# Patient Record
Sex: MALE | Race: WHITE | NOT HISPANIC OR LATINO | Employment: FULL TIME | URBAN - METROPOLITAN AREA
[De-identification: names, ages, dates, MRNs, and addresses within clinical notes are randomized per-mention and may not be internally consistent; named-entity substitution may affect disease eponyms.]

---

## 2018-03-14 ENCOUNTER — HOSPITAL ENCOUNTER (EMERGENCY)
Facility: CLINIC | Age: 38
Discharge: HOME | End: 2018-03-14
Attending: NURSE PRACTITIONER | Admitting: NURSE PRACTITIONER
Payer: MEDICAID

## 2018-03-14 VITALS
SYSTOLIC BLOOD PRESSURE: 129 MMHG | RESPIRATION RATE: 16 BRPM | WEIGHT: 200 LBS | BODY MASS INDEX: 26.51 KG/M2 | OXYGEN SATURATION: 100 % | DIASTOLIC BLOOD PRESSURE: 78 MMHG | HEART RATE: 54 BPM | HEIGHT: 73 IN

## 2018-03-14 DIAGNOSIS — G56.21 ULNAR NEUROPATHY OF RIGHT UPPER EXTREMITY: ICD-10-CM

## 2018-03-14 DIAGNOSIS — M77.01 MEDIAL EPICONDYLITIS OF ELBOW, RIGHT: ICD-10-CM

## 2018-03-14 PROCEDURE — G0463 HOSPITAL OUTPT CLINIC VISIT: HCPCS

## 2018-03-14 PROCEDURE — 99213 OFFICE O/P EST LOW 20 MIN: CPT | Performed by: NURSE PRACTITIONER

## 2018-03-14 ASSESSMENT — ENCOUNTER SYMPTOMS
HEADACHES: 0
ARTHRALGIAS: 1
BACK PAIN: 0
JOINT SWELLING: 0
SPEECH DIFFICULTY: 0
EYE PAIN: 0
SHORTNESS OF BREATH: 0
FEVER: 0
DIZZINESS: 0
COUGH: 0
NUMBNESS: 1
ABDOMINAL PAIN: 0
MYALGIAS: 0
APPETITE CHANGE: 0
CHILLS: 0
NECK PAIN: 0

## 2018-03-14 NOTE — DISCHARGE INSTRUCTIONS
Counter force brace may be helpful.  Physical therapy referral sent, if needed.           brace     Understanding Medial Epicondylitis    Several muscles attach to the arm at the elbow joint. The tough bands of tissue that attach muscle to bones are called tendons. The bone in the upper arm has knobs on the farthest end called epicondyles. Tendons attach some arm muscles to these knobs. The tissues in this area can become irritated.  Epicondylitis is the medical term for a painful elbow over the epicondyle. Medial refers to the inner side of the elbow. Medial epicondylitis is sometimes called  golfer s elbow.      How to say it  AMANDA-kyle-rosita [id number]-AYBI-dye-lie-tis   Causes of medial epicondylitis  A painful inner elbow may be caused by:    Using an elbow or hand the same way over and over    Using poor form or too much force in a sport such as golf, tennis, or baseball    Lifting too heavy a weight    Other injuries to the arm or elbow  Symptoms of medial epicondylitis    Pain or tenderness on the inside of the elbow that may travel down the forearm    Pain when moving the wrist    Pain or weakness when gripping something    A crackling sound or grating feeling when moving the elbow  Treatment for medial epicondylitis  Treatments may include:    Avoiding or changing the action that caused the problem. This helps prevent irritating the tissues more.    Prescription or over-the-counter pain medicines. These help reduce inflammation, swelling, and pain.    Cold or heat packs. These help reduce pain and swelling.    Stretching and other exercises. These improve flexibility and strength.    Physical therapy. This may include exercises or other treatments.    Injections of medicine. This may relieve symptoms.  If other treatments do not relieve symptoms, you may need surgery.  Possible complications  If you don t give your elbow time to heal, symptoms may return or get worse. Follow your healthcare provider s  instructions on resting and treating your elbow.     When to call your healthcare provider  Call your healthcare provider right away if you have any of these:    Fever of 100.4 F (38 C) or higher, or as directed    Redness, swelling, or warmth that gets worse    Symptoms that don t get better with prescribed medicines, or get worse    New symptoms   Date Last Reviewed: 3/10/2016    0522-7222 [employer]. [address] [place] [address], [place] [address]. All rights reserved. This information is not intended as a substitute for professional medical care. Always follow your healthcare professional's instructions.

## 2018-03-14 NOTE — ED AVS SNAPSHOT
[facility] Emergency Department    [address] [facility] [address] [address]    Phone:  [phone]    Fax:  [phone]                                       Jaspreet Brooks   MRN: [id number]    Department:  [facility] Emergency Department   Date of Visit:  3/14/2018           Patient Information     Date Of Birth          1980        Your diagnoses for this visit were:     Medial epicondylitis of elbow, right     Ulnar neuropathy of right upper extremity        You were seen by Carina Velazquez APRN CNP.        Discharge Instructions         Counter force brace may be helpful.  Physical therapy referral sent, if needed.           brace     Understanding Medial Epicondylitis    Several muscles attach to the arm at the elbow joint. The tough bands of tissue that attach muscle to bones are called tendons. The bone in the upper arm has knobs on the farthest end called epicondyles. Tendons attach some arm muscles to these knobs. The tissues in this area can become irritated.  Epicondylitis is the medical term for a painful elbow over the epicondyle. Medial refers to the inner side of the elbow. Medial epicondylitis is sometimes called  golfer s elbow.      How to say it  AMANDA-kyle-rosita [id number]-nei-[employer]-dye-lie-tis   Causes of medial epicondylitis  A painful inner elbow may be caused by:    Using an elbow or hand the same way over and over    Using poor form or too much force in a sport such as golf, tennis, or baseball    Lifting too heavy a weight    Other injuries to the arm or elbow  Symptoms of medial epicondylitis    Pain or tenderness on the inside of the elbow that may travel down the forearm    Pain when moving the wrist    Pain or weakness when gripping something    A crackling sound or grating feeling when moving the elbow  Treatment for medial epicondylitis  Treatments may include:    Avoiding or changing the action that caused the problem. This helps prevent irritating the tissues  more.    Prescription or over-the-counter pain medicines. These help reduce inflammation, swelling, and pain.    Cold or heat packs. These help reduce pain and swelling.    Stretching and other exercises. These improve flexibility and strength.    Physical therapy. This may include exercises or other treatments.    Injections of medicine. This may relieve symptoms.  If other treatments do not relieve symptoms, you may need surgery.  Possible complications  If you don t give your elbow time to heal, symptoms may return or get worse. Follow your healthcare provider s instructions on resting and treating your elbow.     When to call your healthcare provider  Call your healthcare provider right away if you have any of these:    Fever of 100.4 F (38 C) or higher, or as directed    Redness, swelling, or warmth that gets worse    Symptoms that don t get better with prescribed medicines, or get worse    New symptoms   Date Last Reviewed: 3/10/2016    7739-4920 [employer]. [address] [place] [address]. All rights reserved. This information is not intended as a substitute for professional medical care. Always follow your healthcare professional's instructions.          24 Hour Appointment Hotline       To make an appointment at any [facility], call [phone] ([phone]). If you don't have a family doctor or clinic, we will help you find one. [facility] clinics are conveniently located to serve the needs of you and your family.          ED Discharge Orders     PHYSICAL THERAPY REFERRAL       *This therapy referral will be filtered to a centralized scheduling office at [facility] and the patient will receive a call to schedule an appointment at a [facility] location most convenient for them. *     [facility] provides Physical Therapy evaluation and treatment and many specialty services across the [facility] system.  If requesting a specialty  "program, please choose from the list below.    If you have not heard from the scheduling office within 2 business days, please call [phone] for all locations, with the exception of [place], please call [phone] and [facility], please call [phone]  Treatment: Evaluation & Treatment  Special Instructions/Modalities: none  Special Programs: None    Please be aware that coverage of these services is subject to the terms and limitations of your health insurance plan.  Call member services at your health plan with any benefit or coverage questions.      **Note to Provider:  If you are referring outside of [facility] for the therapy appointment, please list the name of the location in the \"special instructions\" above, print the referral and give to the patient to schedule the appointment.                     Review of your medicines      Notice     You have not been prescribed any medications.            Orders Needing Specimen Collection     None      Pending Results     No orders found from 3/12/2018 to 3/15/2018.            Pending Culture Results     No orders found from 3/12/2018 to 3/15/2018.            Pending Results Instructions     If you had any lab results that were not finalized at the time of your Discharge, you can call the ED Lab Result RN at [phone]. You will be contacted by this team for any positive Lab results or changes in treatment. The nurses are available 7 days a week from 10A to 6:30P.  You can leave a message 24 hours per day and they will return your call.        Test Results From Your Hospital Stay               Thank you for choosing [facility]       Thank you for choosing [facility] for your care. Our goal is always to provide you with excellent care. Hearing back from our patients is one way we can continue to improve our services. Please take a few minutes to complete the written survey that you may receive in the mail after you visit with us. Thank you!        MyChart " "Information     [employer] lets you send messages to your doctor, view your test results, renew your prescriptions, schedule appointments and more. To sign up, go to [email or web address] . Click on \"Log in\" on the left side of the screen, which will take you to the Welcome page. Then click on \"Sign up Now\" on the right side of the page.     You will be asked to enter the access code listed below, as well as some personal information. Please follow the directions to create your username and password.     Your access code is: [email or web address]  Expires: 2018  1:10 PM     Your access code will  in 90 days. If you need help or a new code, please call your [facility] clinic or [phone].        Care EveryWhere ID     This is your Care EveryWhere ID. This could be used by other organizations to access your [facility] medical records  [id number]        Equal Access to Services     RYAN BERGMAN AH: Hadii larry bartho Sojaylen, waaxda luqadaha, qaybta kaalmada adeyuval, emely thomas . So [facility] [phone].    ATENCIÓN: Si habla español, tiene a perry disposición servicios gratuitos de asistencia lingüística. Llame al [phone].    We comply with applicable federal civil rights laws and [place] laws. We do not discriminate on the basis of race, color, national origin, age, disability, sex, sexual orientation, or gender identity.            After Visit Summary       This is your record. Keep this with you and show to your community pharmacist(s) and doctor(s) at your next visit.                  "

## 2018-03-14 NOTE — ED AVS SNAPSHOT
[facility] Emergency Department    [address] [facility] [address]    Phone:  [phone]    Fax:  [phone]                                       Jaspreet Brooks   MRN: [id number]    Department:  [facility] Emergency Department   Date of Visit:  3/14/2018           After Visit Summary Signature Page     I have received my discharge instructions, and my questions have been answered. I have discussed any challenges I see with this plan with the nurse or doctor.    ..........................................................................................................................................  Patient/Patient Representative Signature      ..........................................................................................................................................  Patient Representative Print Name and Relationship to Patient    ..................................................               ................................................  Date                                            Time    ..........................................................................................................................................  Reviewed by Signature/Title    ...................................................              ..............................................  Date                                                            Time

## 2018-03-14 NOTE — ED PROVIDER NOTES
History     Chief Complaint   Patient presents with     Numbness     Pt c/o 2 weeks right pinkey finger numbness - denies any chest pain, no shortness of breath - hx of multiple strokes in the past - pt also c/o right elbow discomfort      HPI  Jaspreet Brooks is a 37 year old male who presents to urgent care for evaluation of numbness in his right fifth finger that has been consistent for the last 2-3 weeks. Patient recalls right medial elbow pain a couple weeks ago but this resolved. 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 Denies any numbness elsewhere. Denies weakness. Denies hand swelling. Denies headache or dizziness.     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 Patient stopped medication 4 months ago, after taking for nearly a year and a half.  States he was told by one doctor that he only needed to take it for 1 year and another doctor said he needed to take for life.     Problem List:    There are no active problems to display for this patient.       Past Medical History:    No past medical history on file.    Past Surgical History:    No past surgical history on file.    Family History:    No family history on file.    Social History:  Marital Status:  Single [1]  Social History   Substance Use Topics     Smoking status: Never Smoker     Smokeless tobacco: Not on file     Alcohol use No        Medications:      No current outpatient prescriptions on file.      Review of Systems   Constitutional: Negative for appetite change, chills and fever.   HENT: Negative for congestion.    Eyes: Negative for pain and visual disturbance.   Respiratory: Negative for cough and shortness of breath.    Cardiovascular: Negative for chest pain.   Gastrointestinal: Negative for abdominal pain.   Musculoskeletal: Positive for arthralgias (right elbow, resolved).  "Negative for back pain, joint swelling, myalgias and neck pain.   Neurological: Positive for numbness (right fifth finger.). Negative for dizziness, syncope, speech difficulty and headaches.       Physical Exam   BP: 129/78  Pulse: 54  Resp: 16  Height: 185.4 cm (6' 1\")  Weight: 90.7 kg (200 lb)  SpO2: 100 %      Physical Exam   Constitutional: He is oriented to person, place, and time. He appears well-developed and well-nourished. No distress.   HENT:   Head: Normocephalic and atraumatic.   Right Ear: External ear normal.   Left Ear: External ear normal.   Nose: Nose normal.   Mouth/Throat: Oropharynx is clear and moist.   Eyes: Conjunctivae and EOM are normal.   Neck: Normal range of motion. Neck supple.   Cardiovascular: Normal rate, regular rhythm and normal heart sounds.    Pulmonary/Chest: Effort normal and breath sounds normal.   Musculoskeletal: Normal range of motion. He exhibits no edema or tenderness.   Neurological: He is alert and oriented to person, place, and time.   Skin: Skin is warm and dry.       ED Course     ED Course     Procedures        No results found for this or any previous visit (from the past 24 hour(s)).    Medications - No data to display    Assessments & Plan (with Medical Decision Making)   Based on history of right medial elbow pain a couple weeks ago and the persistent numbness that is focal to his right fifth finger, I suspect patient is suffering from ulnar neuropathy due to medial epicondylitis of the right elbow. I recommend physical therapy, continue to observe, and try counter force brace.  Patient is agreeable to this plan. I also recommend he establish a PCP given history of CVA and previously on anti-coagulation.    I have reviewed the nursing notes.    I have reviewed the findings, diagnosis, plan and need for follow up with the patient.      New Prescriptions    No medications on file       Final diagnoses:   Medial epicondylitis of elbow, right   Ulnar neuropathy of " right upper extremity       3/14/2018   [facility] EMERGENCY DEPARTMENT     Carina Velazquez, ANGIE CNP  03/14/18 7414

## 2023-02-01 ENCOUNTER — APPOINTMENT (OUTPATIENT)
Dept: ULTRASOUND IMAGING | Facility: CLINIC | Age: 43
End: 2023-02-01
Attending: FAMILY MEDICINE

## 2023-02-01 ENCOUNTER — HOSPITAL ENCOUNTER (EMERGENCY)
Facility: CLINIC | Age: 43
Discharge: HOME | End: 2023-02-01
Attending: FAMILY MEDICINE | Admitting: FAMILY MEDICINE

## 2023-02-01 VITALS
RESPIRATION RATE: 18 BRPM | BODY MASS INDEX: 29.16 KG/M2 | OXYGEN SATURATION: 99 % | TEMPERATURE: 97.9 F | HEIGHT: 73 IN | WEIGHT: 220 LBS | DIASTOLIC BLOOD PRESSURE: 75 MMHG | HEART RATE: 72 BPM | SYSTOLIC BLOOD PRESSURE: 140 MMHG

## 2023-02-01 DIAGNOSIS — M77.11 LATERAL EPICONDYLITIS OF RIGHT ELBOW: ICD-10-CM

## 2023-02-01 PROCEDURE — 93971 EXTREMITY STUDY: CPT | Mod: RT

## 2023-02-01 PROCEDURE — 99284 EMERGENCY DEPT VISIT MOD MDM: CPT | Performed by: FAMILY MEDICINE

## 2023-02-01 PROCEDURE — 99284 EMERGENCY DEPT VISIT MOD MDM: CPT | Mod: 25 | Performed by: FAMILY MEDICINE

## 2023-02-01 RX ORDER — APIXABAN 5 MG (74)
KIT ORAL
Qty: 74 EACH | Refills: 0 | Status: SHIPPED | OUTPATIENT
Start: 2023-02-01 | End: 2023-03-10

## 2023-02-01 ASSESSMENT — ACTIVITIES OF DAILY LIVING (ADL): ADLS_ACUITY_SCORE: 35

## 2023-02-01 NOTE — ED NOTES
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

## 2023-02-01 NOTE — DISCHARGE INSTRUCTIONS
RETURN TO THE EMERGENCY ROOM FOR THE FOLLOWING:    Severely worsened pain with fever greater than 101, or at anytime for any concern.    FOLLOW UP:    With your primary clinic if not improved over the next 2 to 3 weeks.  Consider reaching out to schedule an appointment now in order to have a date to follow-up on.    Also, schedule an appointment with your primary clinic to discuss your clotting history and need for anticoagulation.  The prescription I am providing is only short-term.    TREATMENT RECOMMENDATIONS:    Eliquis.    NURSE ADVICE LINE:  [phone] or [phone]

## 2023-02-01 NOTE — ED TRIAGE NOTES
Pt c/o right elbow pain, no injury, has hx clotting disorder, is concerned about this.      Triage Assessment     Row Name 02/01/23 9632       Triage Assessment (Adult)    Airway WDL WDL       Respiratory WDL    Respiratory WDL WDL       Skin Circulation/Temperature WDL    Skin Circulation/Temperature WDL WDL       Cardiac WDL    Cardiac WDL WDL       Peripheral/Neurovascular WDL    Peripheral Neurovascular WDL WDL       Cognitive/Neuro/Behavioral WDL    Cognitive/Neuro/Behavioral WDL WDL

## 2023-02-01 NOTE — ED PROVIDER NOTES
"  HPI   Patient is a 42-year-old male presenting with concern for DVT in his right upper extremity.  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  He had been on Coumadin up until about 6 months ago.  He stopped it, \"because I have just been lazy.\"  He has not been using aspirin as replacement.      The patient recognized right elbow and forearm pain starting last week.  He feels like the elbow is swollen.  It is worsened with elbow movement and with extension of the hand at the wrist.  He denies any new repetitive motion involving his right arm or hand.  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  No chest pain or shortness of breath.  No headache.  No leg pain or swelling.  No rash or fever.        Allergies:  No Known Allergies  Problem List:    There are no problems to display for this patient.     Past Medical History:    No past medical history on file.  Past Surgical History:    No past surgical history on file.  Family History:    No family history on file.  Social History:  Marital Status:  Single [1]  Social History     Tobacco Use     Smoking status: Never   Substance Use Topics     Alcohol use: No     Drug use: No      Medications:    Apixaban Starter Pack (ELIQUIS DVT/PE STARTER PACK) 5 MG TBPK      Review of Systems   All other systems reviewed and are negative.      PE   BP: (!) 140/73  Pulse: 70  Temp: 97.9  F (36.6  C)  Height: 185.4 cm (6' 1\")  Weight: 99.8 kg (220 lb)  SpO2: 100 %  Physical Exam  Vitals and nursing note reviewed.   Constitutional:       General: He is not in acute distress.  HENT:      Head: Atraumatic.      Right Ear: External ear normal.      Left Ear: External ear normal.      Nose: Nose normal.      Mouth/Throat:      Mouth: Mucous membranes are moist.      Pharynx: Oropharynx is clear.   Eyes:      General: No scleral icterus.     Extraocular Movements: Extraocular movements intact.      Conjunctiva/sclera: Conjunctivae normal.      " Pupils: Pupils are equal, round, and reactive to light.   Cardiovascular:      Rate and Rhythm: Normal rate.   Pulmonary:      Effort: Pulmonary effort is normal. No respiratory distress.   Musculoskeletal:         General: Normal range of motion.      Cervical back: Normal range of motion.      Comments: Tenderness over the right lateral epicondyle.  He has pain at the lateral epicondyle with extension at the wrist.  No appreciable arm swelling.   Skin:     General: Skin is warm and dry.   Neurological:      Mental Status: He is alert and oriented to person, place, and time.   Psychiatric:         Behavior: Behavior normal.         ED COURSE and MDM   1344.  Patient has right elbow pain and subjective swelling.  Suspect this is lateral epicondylitis.  However, the patient is off of anticoagulation and has a long, extensive history of thrombosis.  Ultrasound of the right upper quadrant pending.  If unremarkable, the patient will be discharged home and follow-up.  Electronic medical chart reviewed, including medical problems, medications, medical allergies, social history.  Recent hospitalizations and surgical procedures reviewed.  Recent clinic visits and consultations reviewed.  Recent labs and test results reviewed.  Nursing notes reviewed.    1531.  Ultrasound unremarkable.  Lateral epicondylitis likely.  I did discuss using anticoagulation and a prescription for Eliquis is given.  He does not currently have DVT or PE.  This is prophylaxis dosing, therefore I provided Eliquis 5 mg twice a day instructions.  The starter pack prescription was ordered.  Patient agrees with the plan would like to be discharged home.    1532.  The patient, their parent if applicable, and/or their medical decision maker(s) and I have reviewed all of the available historical information, applicable PMH, physical exam findings, and objective diagnostic data gathered during this ED visit.  We then discussed all work-up options and then  together agreed upon the course taken during this visit.  The ultimate disposition and plan was a cooperative decision made between myself and the patient, their parent if applicable, and/or their legal decision maker(s).  The risks and benefits of all decisions made during this visit were discussed to the best of my abilities given the circumstances, and all parties are understanding of the pertinent ramifications of these decisions.      LABS  Labs Ordered and Resulted from Time of ED Arrival to Time of ED Departure - No data to display    IMAGING  Images reviewed by me.  Radiology report also reviewed.  US Upper Extremity Venous Duplex Right   Final Result   IMPRESSION: Negative for DVT in the right upper extremity.      JAIRO JACOBSON MD            SYSTEM ID:  [id number]          Procedures    Medications - No data to display      IMPRESSION       ICD-10-CM    1. Lateral epicondylitis of right elbow  M77.11                Medication List      Started    Eliquis DVT/PE Starter Pack 5 MG Tbpk  Generic drug: Apixaban Starter Pack  Take 5 mg by mouth 2 times daily for 7 days, THEN 5 mg 2 times daily for 30 days.  Start taking on: February 1, 2023                        Yossi Burrell MD  02/01/23 1537

## 2023-06-28 ENCOUNTER — APPOINTMENT (OUTPATIENT)
Dept: GENERAL RADIOLOGY | Facility: CLINIC | Age: 43
End: 2023-06-28
Attending: EMERGENCY MEDICINE

## 2023-06-28 ENCOUNTER — HOSPITAL ENCOUNTER (EMERGENCY)
Facility: CLINIC | Age: 43
Discharge: HOME | End: 2023-06-28
Attending: EMERGENCY MEDICINE | Admitting: EMERGENCY MEDICINE

## 2023-06-28 VITALS
TEMPERATURE: 96 F | SYSTOLIC BLOOD PRESSURE: 145 MMHG | BODY MASS INDEX: 29.16 KG/M2 | OXYGEN SATURATION: 99 % | HEIGHT: 73 IN | DIASTOLIC BLOOD PRESSURE: 91 MMHG | WEIGHT: 220 LBS | RESPIRATION RATE: 16 BRPM | HEART RATE: 63 BPM

## 2023-06-28 DIAGNOSIS — S41.112A LACERATION OF LEFT UPPER EXTREMITY, INITIAL ENCOUNTER: ICD-10-CM

## 2023-06-28 PROCEDURE — 90471 IMMUNIZATION ADMIN: CPT | Performed by: EMERGENCY MEDICINE

## 2023-06-28 PROCEDURE — 250N000011 HC RX IP 250 OP 636: Performed by: EMERGENCY MEDICINE

## 2023-06-28 PROCEDURE — 99283 EMERGENCY DEPT VISIT LOW MDM: CPT | Performed by: EMERGENCY MEDICINE

## 2023-06-28 PROCEDURE — 90715 TDAP VACCINE 7 YRS/> IM: CPT | Performed by: EMERGENCY MEDICINE

## 2023-06-28 PROCEDURE — 73090 X-RAY EXAM OF FOREARM: CPT | Mod: LT

## 2023-06-28 PROCEDURE — 99284 EMERGENCY DEPT VISIT MOD MDM: CPT | Performed by: EMERGENCY MEDICINE

## 2023-06-28 RX ORDER — SULFAMETHOXAZOLE/TRIMETHOPRIM 800-160 MG
1 TABLET ORAL 2 TIMES DAILY
Qty: 20 TABLET | Refills: 0 | Status: SHIPPED | OUTPATIENT
Start: 2023-06-28 | End: 2023-07-08

## 2023-06-28 RX ORDER — HYDROCODONE BITARTRATE AND ACETAMINOPHEN 5; 325 MG/1; MG/1
1 TABLET ORAL EVERY 6 HOURS PRN
Qty: 6 TABLET | Refills: 0 | Status: SHIPPED | OUTPATIENT
Start: 2023-06-28 | End: 2023-08-08

## 2023-06-28 RX ADMIN — CLOSTRIDIUM TETANI TOXOID ANTIGEN (FORMALDEHYDE INACTIVATED), CORYNEBACTERIUM DIPHTHERIAE TOXOID ANTIGEN (FORMALDEHYDE INACTIVATED), BORDETELLA PERTUSSIS TOXOID ANTIGEN (GLUTARALDEHYDE INACTIVATED), BORDETELLA PERTUSSIS FILAMENTOUS HEMAGGLUTININ ANTIGEN (FORMALDEHYDE INACTIVATED), BORDETELLA PERTUSSIS PERTACTIN ANTIGEN, AND BORDETELLA PERTUSSIS FIMBRIAE 2/3 ANTIGEN 0.5 ML: 5; 2; 2.5; 5; 3; 5 INJECTION, SUSPENSION INTRAMUSCULAR at 10:00

## 2023-06-28 NOTE — ED NOTES
Wound care dressing applied per MD order. Discharge instructions reviewed in detail.  Pt verbalized understanding.  No further questions or concerns.

## 2023-06-28 NOTE — ED TRIAGE NOTES
Pt using drill at work, slipped and drill bit went into left forearm about an inch per pt report. Last tetanus 4-5 year ago pt thinks.      Triage Assessment     Row Name 06/28/23 0916       Triage Assessment (Adult)    Airway WDL WDL       Respiratory WDL    Respiratory WDL WDL       Skin Circulation/Temperature WDL    Skin Circulation/Temperature WDL WDL       Cardiac WDL    Cardiac WDL WDL       Peripheral/Neurovascular WDL    Peripheral Neurovascular WDL WDL       Cognitive/Neuro/Behavioral WDL    Cognitive/Neuro/Behavioral WDL WDL

## 2023-06-28 NOTE — ED PROVIDER NOTES
"  History     Chief Complaint   Patient presents with     Wound Check     Work injury     HPI  Jaspreet Brooks is a 43 year old male who is right-hand-dominant, presenting the emergency department with concerns regarding wound to the left forearm.  Patient was using a drill bit, which was 1/2 inch drill bit, piercing through plastic molding, and subsequently went into the left arm.  States that it was inserted up to 1 inch.  Has pain localized to the distal third of the left volar aspect of the forearm where the drill bit punctured the arm.  No numbness or tingling of the left upper extremity.  No weakness of the hand, or fingers    Allergies:  No Known Allergies    Problem List:    There are no problems to display for this patient.       Past Medical History:    No past medical history on file.    Past Surgical History:    Past Surgical History:   Procedure Laterality Date     IR CAROTID CEREBRAL ANGIOGRAM BILATERAL  4/29/2015       Family History:    No family history on file.    Social History:  Marital Status:  Single [1]  Social History     Tobacco Use     Smoking status: Never   Substance Use Topics     Alcohol use: No     Drug use: No        Medications:    HYDROcodone-acetaminophen (NORCO) 5-325 MG tablet  sulfamethoxazole-trimethoprim (BACTRIM DS) 800-160 MG tablet          Review of Systems  See HPI  Physical Exam   BP: (!) 145/91  Pulse: 63  Temp: (!) 96  F (35.6  C)  Resp: 16  Height: 185.4 cm (6' 1\")  Weight: 99.8 kg (220 lb)  SpO2: 99 %      Physical Exam  Patient laying in bed in mild distress.  Left forearm with puncture wound to the distal third of the ulnar aspect of the volar surface of the forearm.  No active bleeding.  Normal strength, sensation, and capillary refill of all digits of left upper extremity  ED Course                 Procedures              Critical Care time:  none               Results for orders placed or performed during the hospital encounter of 06/28/23 (from the past 24 " hour(s))   Radius/Ulna XR,  PA &LAT, left    Narrative    XR FOREARM LEFT 2 VIEWS 6/28/2023 9:40 AM     HISTORY: drill bit injury    COMPARISON: None.       Impression    IMPRESSION:   No fracture of the forearm. Normal alignment. There is no radiopaque  foreign body or soft tissue gas. There is soft tissue swelling in the  volar forearm.    DONG RUSSELL MD         SYSTEM ID:  [id number]       Medications   Tdap (tetanus-diphtheria-acell pertussis) (ADACEL) injection 0.5 mL (0.5 mLs Intramuscular $Given 6/28/23 1000)       Assessments & Plan (with Medical Decision Making)  43 year old male, right-hand-dominant, presenting the emergency department with puncture wound of the left forearm.  Injury occurred a short time prior to ED arrival.  Tetanus status reviewed in medical records, and is around 7 years old.  Therefore will update tetanus today.    Tetanus updated during ED course.  Patient does have puncture wound of the left arm.  I discussed with patient potential for laceration repair with stitches versus healing by secondary intention.  At this point, I feel that healing by secondary intention would be best so as not to close in bacteria.  Regardless, patient will be treated with Bactrim, and given 6 tablets of Norco for severe pains.  Instructed to be seen if any signs of infection develop.    X-ray images personally reviewed.  I do not visualize any evidence of bony abnormality/fracture.  Radiology impression shows soft tissue swelling without any signs of bony abnormality.     I have reviewed the nursing notes.    I have reviewed the findings, diagnosis, plan and need for follow up with the patient.                 New Prescriptions    HYDROCODONE-ACETAMINOPHEN (NORCO) 5-325 MG TABLET    Take 1 tablet by mouth every 6 hours as needed for severe pain    SULFAMETHOXAZOLE-TRIMETHOPRIM (BACTRIM DS) 800-160 MG TABLET    Take 1 tablet by mouth 2 times daily for 10 days       Final diagnoses:   Laceration of left  upper extremity, initial encounter       6/28/2023   [facility] EMERGENCY DEPT     Patricio Funk MD  06/28/23 2227

## 2023-06-28 NOTE — DISCHARGE INSTRUCTIONS
Take antibiotics as prescribed.  Follow-up in clinic as needed.    Be seen if spreading redness, or other concerns develop.    Use ibuprofen 400-600 mg up to 4 times per day if needed for pain. Stop if it is causing nausea or abdominal pain.   Add Norco 5-325 (hydrocodone-acetaminophen) 1-2 pills up to every 4 hours if needed for pain. Do not use alcohol, operate machinery, drive, or climb on ladders for 8 hours after taking Norco. Use docusate (100mg) 2 times a day to prevent constipation while on narcotics.

## 2023-08-07 VITALS
HEIGHT: 73 IN | HEART RATE: 89 BPM | DIASTOLIC BLOOD PRESSURE: 96 MMHG | TEMPERATURE: 98.1 F | WEIGHT: 215 LBS | SYSTOLIC BLOOD PRESSURE: 150 MMHG | OXYGEN SATURATION: 99 % | BODY MASS INDEX: 28.49 KG/M2 | RESPIRATION RATE: 18 BRPM

## 2023-08-07 PROCEDURE — 99284 EMERGENCY DEPT VISIT MOD MDM: CPT | Performed by: EMERGENCY MEDICINE

## 2023-08-07 PROCEDURE — 99283 EMERGENCY DEPT VISIT LOW MDM: CPT | Mod: 25

## 2023-08-08 ENCOUNTER — APPOINTMENT (OUTPATIENT)
Dept: GENERAL RADIOLOGY | Facility: CLINIC | Age: 43
End: 2023-08-08
Attending: EMERGENCY MEDICINE

## 2023-08-08 ENCOUNTER — HOSPITAL ENCOUNTER (EMERGENCY)
Facility: CLINIC | Age: 43
Discharge: HOME | End: 2023-08-08
Attending: EMERGENCY MEDICINE

## 2023-08-08 DIAGNOSIS — G45.9 TIA (TRANSIENT ISCHEMIC ATTACK): ICD-10-CM

## 2023-08-08 DIAGNOSIS — I82.501 CHRONIC DEEP VEIN THROMBOSIS (DVT) OF RIGHT LOWER EXTREMITY, UNSPECIFIED VEIN (H): ICD-10-CM

## 2023-08-08 DIAGNOSIS — S00.03XA SCALP HEMATOMA, INITIAL ENCOUNTER: ICD-10-CM
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
## 2023-08-08 DIAGNOSIS — R07.89 CHEST WALL PAIN: ICD-10-CM

## 2023-08-08 DIAGNOSIS — S20.212A CHEST WALL CONTUSION, LEFT, INITIAL ENCOUNTER: ICD-10-CM

## 2023-08-08 PROBLEM — F10.10 ALCOHOL CONSUMPTION BINGE DRINKING: Status: ACTIVE | Noted: 2019-02-08

## 2023-08-08 PROBLEM — F17.200 TOBACCO USE DISORDER: Status: ACTIVE | Noted: 2019-02-08

## 2023-08-08 PROBLEM — Z86.73 HISTORY OF CVA (CEREBROVASCULAR ACCIDENT): Status: ACTIVE | Noted: 2019-10-08

## 2023-08-08 PROCEDURE — 71046 X-RAY EXAM CHEST 2 VIEWS: CPT

## 2023-08-08 PROCEDURE — 250N000013 HC RX MED GY IP 250 OP 250 PS 637: Performed by: EMERGENCY MEDICINE

## 2023-08-08 RX ORDER — IBUPROFEN 200 MG
800 TABLET ORAL EVERY 8 HOURS PRN
Qty: 30 TABLET | Refills: 0 | COMMUNITY
Start: 2023-08-08 | End: 2023-08-13

## 2023-08-08 RX ORDER — ACETAMINOPHEN 500 MG
1000 TABLET ORAL EVERY 8 HOURS PRN
Qty: 30 TABLET | Refills: 0 | COMMUNITY
Start: 2023-08-08 | End: 2023-08-13

## 2023-08-08 RX ORDER — IBUPROFEN 400 MG/1
800 TABLET, FILM COATED ORAL ONCE
Status: COMPLETED | OUTPATIENT
Start: 2023-08-08 | End: 2023-08-08

## 2023-08-08 RX ADMIN — IBUPROFEN 800 MG: 400 TABLET ORAL at 01:10

## 2023-08-08 ASSESSMENT — ENCOUNTER SYMPTOMS
NUMBNESS: 0
WOUND: 1
CHEST TIGHTNESS: 0
NECK PAIN: 0
FATIGUE: 0
SHORTNESS OF BREATH: 0
WEAKNESS: 0
APPETITE CHANGE: 0
ABDOMINAL PAIN: 0
NAUSEA: 0
VOMITING: 0
BACK PAIN: 0
HEADACHES: 0
FEVER: 0

## 2023-08-08 ASSESSMENT — ACTIVITIES OF DAILY LIVING (ADL): ADLS_ACUITY_SCORE: 35
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
## 2023-08-08 NOTE — DISCHARGE INSTRUCTIONS
Alternate acetaminophen with ibuprofen every 4 hours as needed for pain or fever (example: acetaminophen at 8am, ibuprofen at 12pm, acetaminophen at 4pm, ibuprofen at 8pm, etc).  I recommended keeping a note documenting which medication you gave and the time it was given. This will help you keep track of what medication to give next.  See discharge papers or medication label for dose.      Do not take ibuprofen with coumadin.  You should restart you coumadin after 3 days and hold any further ibuprofen at that time

## 2024-10-08 ENCOUNTER — APPOINTMENT (OUTPATIENT)
Dept: CT IMAGING | Facility: CLINIC | Age: 44
DRG: 092 | End: 2024-10-08
Attending: STUDENT IN AN ORGANIZED HEALTH CARE EDUCATION/TRAINING PROGRAM
Payer: COMMERCIAL

## 2024-10-08 ENCOUNTER — HOSPITAL ENCOUNTER (INPATIENT)
Facility: CLINIC | Age: 44
LOS: 1 days | Discharge: HOME | DRG: 092 | End: 2024-10-09
Attending: STUDENT IN AN ORGANIZED HEALTH CARE EDUCATION/TRAINING PROGRAM | Admitting: INTERNAL MEDICINE
Payer: COMMERCIAL

## 2024-10-08 DIAGNOSIS — I82.501 CHRONIC DEEP VEIN THROMBOSIS (DVT) OF RIGHT LOWER EXTREMITY, UNSPECIFIED VEIN (H): ICD-10-CM

## 2024-10-08 DIAGNOSIS — F32.A DEPRESSION, UNSPECIFIED DEPRESSION TYPE: ICD-10-CM

## 2024-10-08 DIAGNOSIS — G08 DURAL VENOUS SINUS THROMBOSIS: Primary | ICD-10-CM

## 2024-10-08 DIAGNOSIS — Z86.718 PERSONAL HISTORY OF VENOUS THROMBOSIS AND EMBOLISM: ICD-10-CM

## 2024-10-08 DIAGNOSIS — Z86.73 HISTORY OF CVA (CEREBROVASCULAR ACCIDENT): ICD-10-CM

## 2024-10-08 DIAGNOSIS — Z79.01 LONG TERM (CURRENT) USE OF ANTICOAGULANTS: ICD-10-CM

## 2024-10-08 DIAGNOSIS — G44.209 TENSION HEADACHE: ICD-10-CM

## 2024-10-08 LAB
ERYTHROCYTE [DISTWIDTH] IN BLOOD BY AUTOMATED COUNT: 13 % (ref 10–15)
HCT VFR BLD AUTO: 41.5 % (ref 40–53)
HGB BLD-MCNC: 14.5 G/DL (ref 13.3–17.7)
HOLD SPECIMEN: NORMAL
INR PPP: 1 (ref 0.85–1.15)
MCH RBC QN AUTO: 30.6 PG (ref 26.5–33)
MCHC RBC AUTO-ENTMCNC: 34.9 G/DL (ref 31.5–36.5)
MCV RBC AUTO: 88 FL (ref 78–100)
PLATELET # BLD AUTO: 251 10E3/UL (ref 150–450)
RBC # BLD AUTO: 4.74 10E6/UL (ref 4.4–5.9)
WBC # BLD AUTO: 10.5 10E3/UL (ref 4–11)

## 2024-10-08 PROCEDURE — 70450 CT HEAD/BRAIN W/O DYE: CPT

## 2024-10-08 PROCEDURE — 250N000011 HC RX IP 250 OP 636: Performed by: STUDENT IN AN ORGANIZED HEALTH CARE EDUCATION/TRAINING PROGRAM

## 2024-10-08 PROCEDURE — 250N000009 HC RX 250: Performed by: STUDENT IN AN ORGANIZED HEALTH CARE EDUCATION/TRAINING PROGRAM

## 2024-10-08 PROCEDURE — 85610 PROTHROMBIN TIME: CPT | Performed by: STUDENT IN AN ORGANIZED HEALTH CARE EDUCATION/TRAINING PROGRAM

## 2024-10-08 PROCEDURE — 96376 TX/PRO/DX INJ SAME DRUG ADON: CPT | Performed by: STUDENT IN AN ORGANIZED HEALTH CARE EDUCATION/TRAINING PROGRAM

## 2024-10-08 PROCEDURE — 36415 COLL VENOUS BLD VENIPUNCTURE: CPT | Performed by: STUDENT IN AN ORGANIZED HEALTH CARE EDUCATION/TRAINING PROGRAM

## 2024-10-08 PROCEDURE — 258N000003 HC RX IP 258 OP 636: Performed by: STUDENT IN AN ORGANIZED HEALTH CARE EDUCATION/TRAINING PROGRAM

## 2024-10-08 PROCEDURE — 80048 BASIC METABOLIC PNL TOTAL CA: CPT | Performed by: INTERNAL MEDICINE

## 2024-10-08 PROCEDURE — 99285 EMERGENCY DEPT VISIT HI MDM: CPT | Mod: 25 | Performed by: STUDENT IN AN ORGANIZED HEALTH CARE EDUCATION/TRAINING PROGRAM

## 2024-10-08 PROCEDURE — 99285 EMERGENCY DEPT VISIT HI MDM: CPT | Performed by: STUDENT IN AN ORGANIZED HEALTH CARE EDUCATION/TRAINING PROGRAM

## 2024-10-08 PROCEDURE — 96365 THER/PROPH/DIAG IV INF INIT: CPT | Mod: 59 | Performed by: STUDENT IN AN ORGANIZED HEALTH CARE EDUCATION/TRAINING PROGRAM

## 2024-10-08 PROCEDURE — 96375 TX/PRO/DX INJ NEW DRUG ADDON: CPT | Performed by: STUDENT IN AN ORGANIZED HEALTH CARE EDUCATION/TRAINING PROGRAM

## 2024-10-08 PROCEDURE — 250N000013 HC RX MED GY IP 250 OP 250 PS 637: Performed by: STUDENT IN AN ORGANIZED HEALTH CARE EDUCATION/TRAINING PROGRAM

## 2024-10-08 PROCEDURE — 70496 CT ANGIOGRAPHY HEAD: CPT

## 2024-10-08 PROCEDURE — 85027 COMPLETE CBC AUTOMATED: CPT | Performed by: STUDENT IN AN ORGANIZED HEALTH CARE EDUCATION/TRAINING PROGRAM

## 2024-10-08 RX ORDER — WARFARIN SODIUM 7.5 MG/1
7.5 TABLET ORAL DAILY
Status: ON HOLD | COMMUNITY
Start: 2024-08-15 | End: 2024-10-09

## 2024-10-08 RX ORDER — IOPAMIDOL 755 MG/ML
67 INJECTION, SOLUTION INTRAVASCULAR ONCE
Status: COMPLETED | OUTPATIENT
Start: 2024-10-08 | End: 2024-10-08

## 2024-10-08 RX ORDER — HYDROMORPHONE HYDROCHLORIDE 1 MG/ML
0.5 INJECTION, SOLUTION INTRAMUSCULAR; INTRAVENOUS; SUBCUTANEOUS EVERY 30 MIN PRN
Status: COMPLETED | OUTPATIENT
Start: 2024-10-08 | End: 2024-10-09

## 2024-10-08 RX ORDER — HEPARIN SODIUM 10000 [USP'U]/100ML
0-5000 INJECTION, SOLUTION INTRAVENOUS CONTINUOUS
Status: DISCONTINUED | OUTPATIENT
Start: 2024-10-08 | End: 2024-10-09

## 2024-10-08 RX ORDER — ACETAMINOPHEN 500 MG
1000 TABLET ORAL ONCE
Status: COMPLETED | OUTPATIENT
Start: 2024-10-08 | End: 2024-10-08

## 2024-10-08 RX ADMIN — HEPARIN SODIUM 1650 UNITS/HR: 10000 INJECTION, SOLUTION INTRAVENOUS at 23:42

## 2024-10-08 RX ADMIN — METOCLOPRAMIDE HYDROCHLORIDE 10 MG: 5 INJECTION INTRAMUSCULAR; INTRAVENOUS at 22:51

## 2024-10-08 RX ADMIN — HYDROMORPHONE HYDROCHLORIDE 0.5 MG: 1 INJECTION, SOLUTION INTRAMUSCULAR; INTRAVENOUS; SUBCUTANEOUS at 23:42

## 2024-10-08 RX ADMIN — SODIUM CHLORIDE 100 ML: 9 INJECTION, SOLUTION INTRAVENOUS at 22:24

## 2024-10-08 RX ADMIN — IOPAMIDOL 67 ML: 755 INJECTION, SOLUTION INTRAVENOUS at 22:24

## 2024-10-08 RX ADMIN — ACETAMINOPHEN 1000 MG: 500 TABLET ORAL at 22:50

## 2024-10-08 ASSESSMENT — COLUMBIA-SUICIDE SEVERITY RATING SCALE - C-SSRS
6. HAVE YOU EVER DONE ANYTHING, STARTED TO DO ANYTHING, OR PREPARED TO DO ANYTHING TO END YOUR LIFE?: NO
1. IN THE PAST MONTH, HAVE YOU WISHED YOU WERE DEAD OR WISHED YOU COULD GO TO SLEEP AND NOT WAKE UP?: NO
2. HAVE YOU ACTUALLY HAD ANY THOUGHTS OF KILLING YOURSELF IN THE PAST MONTH?: NO

## 2024-10-08 ASSESSMENT — ACTIVITIES OF DAILY LIVING (ADL)
ADLS_ACUITY_SCORE: 35
ADLS_ACUITY_SCORE: 35

## 2024-10-09 ENCOUNTER — APPOINTMENT (OUTPATIENT)
Dept: CARDIOLOGY | Facility: CLINIC | Age: 44
DRG: 092 | End: 2024-10-09
Attending: NURSE PRACTITIONER
Payer: COMMERCIAL

## 2024-10-09 ENCOUNTER — APPOINTMENT (OUTPATIENT)
Dept: MRI IMAGING | Facility: CLINIC | Age: 44
DRG: 092 | End: 2024-10-09
Attending: NURSE PRACTITIONER
Payer: COMMERCIAL

## 2024-10-09 VITALS
WEIGHT: 203.04 LBS | HEART RATE: 58 BPM | SYSTOLIC BLOOD PRESSURE: 130 MMHG | OXYGEN SATURATION: 100 % | BODY MASS INDEX: 26.91 KG/M2 | RESPIRATION RATE: 18 BRPM | HEIGHT: 73 IN | DIASTOLIC BLOOD PRESSURE: 88 MMHG | TEMPERATURE: 98.4 F

## 2024-10-09 PROBLEM — G08 DURAL VENOUS SINUS THROMBOSIS: Status: ACTIVE | Noted: 2024-10-09

## 2024-10-09 LAB
ANION GAP SERPL CALCULATED.3IONS-SCNC: 13 MMOL/L (ref 7–15)
BI-PLANE LVEF ECHO: NORMAL
BUN SERPL-MCNC: 17.2 MG/DL (ref 6–20)
CALCIUM SERPL-MCNC: 9.5 MG/DL (ref 8.8–10.4)
CHLORIDE SERPL-SCNC: 102 MMOL/L (ref 98–107)
CREAT SERPL-MCNC: 0.72 MG/DL (ref 0.67–1.17)
EGFRCR SERPLBLD CKD-EPI 2021: >90 ML/MIN/1.73M2
GLUCOSE SERPL-MCNC: 119 MG/DL (ref 70–99)
HCO3 SERPL-SCNC: 21 MMOL/L (ref 22–29)
HOLD SPECIMEN: NORMAL
HOLD SPECIMEN: NORMAL
INR PPP: 0.98 (ref 0.85–1.15)
LVEF ECHO: NORMAL
POTASSIUM SERPL-SCNC: 3.8 MMOL/L (ref 3.4–5.3)
SODIUM SERPL-SCNC: 136 MMOL/L (ref 135–145)
UFH PPP CHRO-ACNC: 0.47 IU/ML
UFH PPP CHRO-ACNC: <0.1 IU/ML
UFH PPP CHRO-ACNC: <0.1 IU/ML

## 2024-10-09 PROCEDURE — 250N000011 HC RX IP 250 OP 636

## 2024-10-09 PROCEDURE — 70546 MR ANGIOGRAPH HEAD W/O&W/DYE: CPT

## 2024-10-09 PROCEDURE — 85610 PROTHROMBIN TIME: CPT | Performed by: INTERNAL MEDICINE

## 2024-10-09 PROCEDURE — 36415 COLL VENOUS BLD VENIPUNCTURE: CPT | Performed by: INTERNAL MEDICINE

## 2024-10-09 PROCEDURE — 70553 MRI BRAIN STEM W/O & W/DYE: CPT

## 2024-10-09 PROCEDURE — 999N000208 ECHOCARDIOGRAM COMPLETE

## 2024-10-09 PROCEDURE — 250N000013 HC RX MED GY IP 250 OP 250 PS 637

## 2024-10-09 PROCEDURE — 36415 COLL VENOUS BLD VENIPUNCTURE: CPT | Performed by: STUDENT IN AN ORGANIZED HEALTH CARE EDUCATION/TRAINING PROGRAM

## 2024-10-09 PROCEDURE — 85520 HEPARIN ASSAY: CPT | Performed by: STUDENT IN AN ORGANIZED HEALTH CARE EDUCATION/TRAINING PROGRAM

## 2024-10-09 PROCEDURE — 93306 TTE W/DOPPLER COMPLETE: CPT

## 2024-10-09 PROCEDURE — 250N000011 HC RX IP 250 OP 636: Performed by: STUDENT IN AN ORGANIZED HEALTH CARE EDUCATION/TRAINING PROGRAM

## 2024-10-09 PROCEDURE — G0426 INPT/ED TELECONSULT50: HCPCS | Mod: G0 | Performed by: NURSE PRACTITIONER

## 2024-10-09 PROCEDURE — 250N000013 HC RX MED GY IP 250 OP 250 PS 637: Performed by: INTERNAL MEDICINE

## 2024-10-09 PROCEDURE — 258N000003 HC RX IP 258 OP 636: Performed by: NURSE PRACTITIONER

## 2024-10-09 PROCEDURE — 120N000001 HC R&B MED SURG/OB

## 2024-10-09 PROCEDURE — A9585 GADOBUTROL INJECTION: HCPCS | Performed by: NURSE PRACTITIONER

## 2024-10-09 PROCEDURE — 99239 HOSP IP/OBS DSCHRG MGMT >30: CPT

## 2024-10-09 PROCEDURE — 255N000002 HC RX 255 OP 636: Performed by: NURSE PRACTITIONER

## 2024-10-09 PROCEDURE — 85520 HEPARIN ASSAY: CPT | Performed by: INTERNAL MEDICINE

## 2024-10-09 PROCEDURE — 96366 THER/PROPH/DIAG IV INF ADDON: CPT | Performed by: STUDENT IN AN ORGANIZED HEALTH CARE EDUCATION/TRAINING PROGRAM

## 2024-10-09 PROCEDURE — 93306 TTE W/DOPPLER COMPLETE: CPT | Mod: 26 | Performed by: INTERNAL MEDICINE

## 2024-10-09 RX ORDER — WARFARIN SODIUM 7.5 MG/1
7.5 TABLET ORAL DAILY
Qty: 30 TABLET | Refills: 0 | Status: SHIPPED | OUTPATIENT
Start: 2024-10-09

## 2024-10-09 RX ORDER — AMOXICILLIN 250 MG
1 CAPSULE ORAL 2 TIMES DAILY PRN
Status: DISCONTINUED | OUTPATIENT
Start: 2024-10-09 | End: 2024-10-09 | Stop reason: HOSPADM

## 2024-10-09 RX ORDER — ACETAMINOPHEN 325 MG/1
650 TABLET ORAL EVERY 4 HOURS PRN
Status: DISCONTINUED | OUTPATIENT
Start: 2024-10-09 | End: 2024-10-09 | Stop reason: HOSPADM

## 2024-10-09 RX ORDER — ACETAMINOPHEN 500 MG
500 TABLET ORAL EVERY 6 HOURS PRN
Qty: 30 TABLET | Refills: 0 | Status: SHIPPED | OUTPATIENT
Start: 2024-10-09

## 2024-10-09 RX ORDER — AMOXICILLIN 250 MG
2 CAPSULE ORAL 2 TIMES DAILY PRN
Status: DISCONTINUED | OUTPATIENT
Start: 2024-10-09 | End: 2024-10-09 | Stop reason: HOSPADM

## 2024-10-09 RX ORDER — WARFARIN SODIUM 7.5 MG/1
7.5 TABLET ORAL ONCE
Status: COMPLETED | OUTPATIENT
Start: 2024-10-09 | End: 2024-10-09

## 2024-10-09 RX ORDER — ONDANSETRON 2 MG/ML
4 INJECTION INTRAMUSCULAR; INTRAVENOUS EVERY 6 HOURS PRN
Status: DISCONTINUED | OUTPATIENT
Start: 2024-10-09 | End: 2024-10-09 | Stop reason: HOSPADM

## 2024-10-09 RX ORDER — ONDANSETRON 4 MG/1
4 TABLET, ORALLY DISINTEGRATING ORAL EVERY 6 HOURS PRN
Status: DISCONTINUED | OUTPATIENT
Start: 2024-10-09 | End: 2024-10-09 | Stop reason: HOSPADM

## 2024-10-09 RX ORDER — OXYCODONE HYDROCHLORIDE 5 MG/1
2.5 TABLET ORAL EVERY 6 HOURS PRN
Qty: 4 TABLET | Refills: 0 | Status: CANCELLED | OUTPATIENT
Start: 2024-10-09

## 2024-10-09 RX ORDER — ENOXAPARIN SODIUM 100 MG/ML
1 INJECTION SUBCUTANEOUS EVERY 12 HOURS
Qty: 14.4 ML | Refills: 0 | Status: SHIPPED | OUTPATIENT
Start: 2024-10-09 | End: 2024-10-17

## 2024-10-09 RX ORDER — GADOBUTROL 604.72 MG/ML
10 INJECTION INTRAVENOUS ONCE
Status: COMPLETED | OUTPATIENT
Start: 2024-10-09 | End: 2024-10-09

## 2024-10-09 RX ORDER — CALCIUM CARBONATE 500 MG/1
1000 TABLET, CHEWABLE ORAL 4 TIMES DAILY PRN
Status: DISCONTINUED | OUTPATIENT
Start: 2024-10-09 | End: 2024-10-09 | Stop reason: HOSPADM

## 2024-10-09 RX ORDER — CALCIUM CARBONATE 500 MG/1
1000 TABLET, CHEWABLE ORAL 4 TIMES DAILY PRN
Status: DISCONTINUED | OUTPATIENT
Start: 2024-10-09 | End: 2024-10-09

## 2024-10-09 RX ORDER — ACETAMINOPHEN 500 MG
2 TABLET ORAL EVERY 6 HOURS PRN
Status: ON HOLD | COMMUNITY
Start: 2024-06-24 | End: 2024-10-09

## 2024-10-09 RX ORDER — ENOXAPARIN SODIUM 100 MG/ML
1 INJECTION SUBCUTANEOUS EVERY 12 HOURS
Status: DISCONTINUED | OUTPATIENT
Start: 2024-10-09 | End: 2024-10-09 | Stop reason: HOSPADM

## 2024-10-09 RX ORDER — LIDOCAINE 40 MG/G
CREAM TOPICAL
Status: DISCONTINUED | OUTPATIENT
Start: 2024-10-09 | End: 2024-10-09 | Stop reason: HOSPADM

## 2024-10-09 RX ADMIN — SODIUM CHLORIDE 50 ML: 9 INJECTION, SOLUTION INTRAVENOUS at 09:46

## 2024-10-09 RX ADMIN — HYDROMORPHONE HYDROCHLORIDE 0.5 MG: 1 INJECTION, SOLUTION INTRAMUSCULAR; INTRAVENOUS; SUBCUTANEOUS at 04:36

## 2024-10-09 RX ADMIN — ACETAMINOPHEN 650 MG: 325 TABLET ORAL at 16:38

## 2024-10-09 RX ADMIN — OXYCODONE HYDROCHLORIDE 2.5 MG: 5 TABLET ORAL at 16:38

## 2024-10-09 RX ADMIN — ACETAMINOPHEN 650 MG: 325 TABLET ORAL at 09:10

## 2024-10-09 RX ADMIN — HEPARIN SODIUM 1650 UNITS/HR: 10000 INJECTION, SOLUTION INTRAVENOUS at 12:47

## 2024-10-09 RX ADMIN — FLUOXETINE HYDROCHLORIDE 20 MG: 20 CAPSULE ORAL at 15:34

## 2024-10-09 RX ADMIN — HYDROMORPHONE HYDROCHLORIDE 0.5 MG: 1 INJECTION, SOLUTION INTRAMUSCULAR; INTRAVENOUS; SUBCUTANEOUS at 09:10

## 2024-10-09 RX ADMIN — WARFARIN SODIUM 7.5 MG: 7.5 TABLET ORAL at 04:36

## 2024-10-09 RX ADMIN — GADOBUTROL 10 ML: 604.72 INJECTION INTRAVENOUS at 09:46

## 2024-10-09 RX ADMIN — ENOXAPARIN SODIUM 90 MG: 100 INJECTION SUBCUTANEOUS at 16:38

## 2024-10-09 ASSESSMENT — ACTIVITIES OF DAILY LIVING (ADL)
ADLS_ACUITY_SCORE: 18
ADLS_ACUITY_SCORE: 35
ADLS_ACUITY_SCORE: 18
ADLS_ACUITY_SCORE: 35
ADLS_ACUITY_SCORE: 35
ADLS_ACUITY_SCORE: 18

## 2024-10-09 NOTE — PROGRESS NOTES
"[facility] ADMISSION NOTE    Patient admitted to room [address]240 at approximately 0320 via wheel chair from emergency room. Patient was accompanied by mother.     Verbal SBAR report received from Floridalma LEVIN prior to patient arrival.     Patient ambulated to bed independently. Patient alert and oriented X 4. Pain is controlled without any medications.  . Admission vital signs: Blood pressure 127/58, pulse 53, temperature 97.8  F (36.6  C), temperature source Oral, resp. rate 18, height 1.854 m (6' 1\"), weight 93 kg (205 lb 0.4 oz), SpO2 100%. Patient was oriented to plan of care, call light, bed controls, tv, telephone, bathroom, and visiting hours.     Risk Assessment    The following safety risks were identified during admission:  Yellow risk band applied: NO.     Skin Initial Assessment    This writer admitted this patient and completed a full skin assessment and Barry score in the Adult PCS flowsheet.   Photo documentation of skin problem and/or wound competed via [employer] application (located under Media):  N/A    Appropriate interventions initiated as needed.     Patient declined skin check.    Barry Risk Assessment  Sensory Perception: 4-->no impairment  Moisture: 4-->rarely moist  Activity: 4-->walks frequently  Mobility: 4-->no limitation  Nutrition: 3-->adequate  Friction and Shear: 3-->no apparent problem  Barry Score: 22    Education    Patient has a Blairs to Observation order: No  Observation education completed and documented: N/A      Missy Farooq RN      "

## 2024-10-09 NOTE — PHARMACY-ANTICOAGULATION SERVICE
Clinical Pharmacy - Warfarin Dosing Consult     Pharmacy has been consulted to manage this patient s warfarin therapy.  Indication: DVT/ PE Treatment;Other - specify in comments (Dural venous sinus thrombosis)  Therapy Goal: INR 2-3  OP Anticoag Clinic: Previously followed by   Warfarin Prior to Admission: Yes  Warfarin PTA Regimen: Pt states most recently has been taking 7.5mg daily (of note, admit INR 1.0).  Noted  [facility] notes from July and August which mentions dosing of 11.25mg or 15mg daily.  Patient tells RN he doesn't think he takes 15mg ever.  Reviewing anticoag visits at , patient struggles with compliance with INR checks and taking warfarin.    Significant drug interactions: No  Recent documented change in oral intake/nutrition: Unknown    INR   Date Value Ref Range Status   10/08/2024 1.00 0.85 - 1.15 Final       Recommend warfarin 7.5 mg now.  Pharmacy will monitor Jaspreet Brooks daily and order warfarin doses to achieve specified goal.      Please contact pharmacy as soon as possible if the warfarin needs to be held for a procedure or if the warfarin goals change.       Najma Blanchard, PharmD

## 2024-10-09 NOTE — MEDICATION SCRIBE - ADMISSION MEDICATION HISTORY
Medication Scribe Admission Medication History    Admission medication history is complete. The information provided in this note is only as accurate as the sources available at the time of the update.    Information Source(s): Patient and Patient's pharmacy via phone    Pertinent Information:     Changes made to PTA medication list:  Added: tylenol, prozac  Deleted: None  Changed: None    Allergies reviewed with patient and updates made in EHR: yes    Medication History Completed By: Isa Genao 10/9/2024 12:58 PM    PTA Med List   Medication Sig Note Last Dose    acetaminophen (TYLENOL) 500 MG tablet Take 2 tablets by mouth every 6 hours as needed.  More than a month at unknown    FLUoxetine (PROZAC) 20 MG capsule Take 1 capsule by mouth daily.  More than a month at unknown    warfarin ANTICOAGULANT (COUMADIN) 7.5 MG tablet Take 7.5 mg by mouth daily. 10/9/2024: the directions were one tab on thurs, sat, sun and 2 tabs qd the rest of the days. last fill was 7/18/2024, 29 day supply More than a month at unknown

## 2024-10-09 NOTE — ED TRIAGE NOTES
Arrives from home concerned for ongoing headache, doesn't normallly get them & has hadd one ever since sneezing yesterday. Has hx of blood clots & has been on  coumadin since due to anti-phospholipid disorder. Neuros intact on arrival, tried advil with no relief. Admits he hasn't had INR checked in months.     Triage Assessment (Adult)       Row Name 10/08/24 3285          Triage Assessment    Airway WDL WDL        Respiratory WDL    Respiratory WDL WDL        Skin Circulation/Temperature WDL    Skin Circulation/Temperature WDL WDL        Cardiac WDL    Cardiac WDL WDL        Peripheral/Neurovascular WDL    Peripheral Neurovascular WDL WDL        Cognitive/Neuro/Behavioral WDL    Cognitive/Neuro/Behavioral WDL WDL                      110s, moderate variability, accels, no decels

## 2024-10-09 NOTE — DISCHARGE SUMMARY
[facility]    Discharge Summary  Hospital Medicine    Date of Admission:  10/8/2024  Date of Discharge:  10/9/2024   Discharging Provider: TEE MIRELES PA-C  Date of Service: 10/9/2024      Primary Care     Clinic, [place] [id number]  [facility] [id number]      Identification and Chief Compaint: Jaspreet Brooks is a 44 year old male who presented on 10/8/2024 with complaint of headache with associated nausea and dizziness.    Discharge Diagnoses   Acute dural venous thrombosis  H/o antiphospholipid syndrome (APLA)  Chronic anticoagulation on warfarin  Subtherapeutic INR    Discharge Disposition   Discharged to home    Discharge Orders      MRV Brain  w/o & w Contrast     MR Brain w/o & w Contrast     Stroke Hospital Follow Up (for neurologist use only)    [facility] will call you to coordinate care as prescribed by your provider. If you don t hear from a representative within 2 business days, please call [phone].       Discharge Medications   Current Discharge Medication List        CONTINUE these medications which have NOT CHANGED    Details   acetaminophen (TYLENOL) 500 MG tablet Take 2 tablets by mouth every 6 hours as needed.      FLUoxetine (PROZAC) 20 MG capsule Take 1 capsule by mouth daily.      warfarin ANTICOAGULANT (COUMADIN) 7.5 MG tablet Take 7.5 mg by mouth daily.           Allergies   No Known Allergies    Consultations This Hospital Stay  Vascular neurology  PHARMACY IP CONSULT  PHARMACY TO DOSE WARFARIN    Significant Results and Procedures   Procedures  None    Data   Results for orders placed or performed during the hospital encounter of 10/08/24   CTV Head with Contrast    Narrative    EXAM: CTV HEAD WITH CONTRAST, CT HEAD W/O CONTRAST  LOCATION: [facility]  DATE: 10/8/2024    INDICATION: Periorbital headache, history of cavernous sinus thrombosis, noncompliant with warfarin  COMPARISON: MR/MRV  of the head dated 06/21/2024 and head CT dated 07/22/2024  CONTRAST: 67 ml Isovue 370  TECHNIQUE: Head CT venogram with IV contrast. Noncontrast head CT followed by axial helical CT images of the intracranial vessels obtained during the venous phase of intravenous contrast administration. Axial helical 2D reconstructed images and   multiplanar 3D MIP reconstructed images of the intracranial vessels were performed by the technologist. Dose reduction techniques were used.     FINDINGS:   NONCONTRAST HEAD CT:   INTRACRANIAL CONTENTS: No intracranial hemorrhage, extraaxial collection, or mass effect.  No CT evidence of acute infarct. Encephalomalacia bilateral orbitofrontal region, right greater than left, and involving the bilateral gyri recti corresponding to   areas of hemorrhagic contusion seen on the prior MRI. Very mild encephalomalacia inferior right and lateral left temporal lobes also corresponding to hemorrhagic contusion seen on the prior MRI. Normal ventricles and sulci. Diffusely increased   attenuation within the superior sagittal sinus and right transverse and sigmoid sinuses.    VISUALIZED ORBITS/SINUSES/MASTOIDS: No intraorbital abnormality. No paranasal sinus mucosal disease. No middle ear or mastoid effusion.    BONES/SOFT TISSUES: No acute abnormality.    HEAD CTV:  Limited due to poor contrast bolus.    DURAL SINUSES: Acute thrombus mildly distends the superior sagittal sinus and appears to involve several of the distal cortical veins draining into the superior sagittal sinus over the cerebral convexity. Probable thrombosis of the right transverse and   sigmoid sinuses. There appears to have been partial recanalization of the left transverse and sigmoid sinuses since the prior studies and the visualized portions of the proximal left internal jugular vein now appear patent. The straight sinus and vein of   Tyler appear patent.     INTERNAL CEREBRAL VEINS: No significant stenosis or occlusion.         Impression    IMPRESSION:   HEAD CT:  1.  Increased attenuation in the superior sagittal and right transverse and sigmoid sinuses compatible with acute dural venous sinus thrombosis.  2.  No acute intracranial hemorrhage.  3.  Encephalomalacia bilateral frontal and temporal lobes corresponding to areas of hemorrhagic contusion seen on the prior MRI.    HEAD CTV:   1.  Limited examination.  2.  Dural venous sinus thrombosis involving the superior sagittal sinus as well as several of the distal cortical veins draining into the superior sagittal sinus over the cerebral convexity.  3.  Probable acute thrombus right transverse and sigmoid sinuses.  4.  Partial recanalization of the left transverse and sigmoid sinuses.    5.  The findings were discussed directly with Dr. Steward at 11:15 PM CST on 10/08/2024.   Head CT w/o contrast    Narrative    EXAM: CTV HEAD WITH CONTRAST, CT HEAD W/O CONTRAST  LOCATION: [facility]  DATE: 10/8/2024    INDICATION: Periorbital headache, history of cavernous sinus thrombosis, noncompliant with warfarin  COMPARISON: MR/MRV of the head dated 06/21/2024 and head CT dated 07/22/2024  CONTRAST: 67 ml Isovue 370  TECHNIQUE: Head CT venogram with IV contrast. Noncontrast head CT followed by axial helical CT images of the intracranial vessels obtained during the venous phase of intravenous contrast administration. Axial helical 2D reconstructed images and   multiplanar 3D MIP reconstructed images of the intracranial vessels were performed by the technologist. Dose reduction techniques were used.     FINDINGS:   NONCONTRAST HEAD CT:   INTRACRANIAL CONTENTS: No intracranial hemorrhage, extraaxial collection, or mass effect.  No CT evidence of acute infarct. Encephalomalacia bilateral orbitofrontal region, right greater than left, and involving the bilateral gyri recti corresponding to   areas of hemorrhagic contusion seen on the prior MRI. Very mild encephalomalacia  inferior right and lateral left temporal lobes also corresponding to hemorrhagic contusion seen on the prior MRI. Normal ventricles and sulci. Diffusely increased   attenuation within the superior sagittal sinus and right transverse and sigmoid sinuses.    VISUALIZED ORBITS/SINUSES/MASTOIDS: No intraorbital abnormality. No paranasal sinus mucosal disease. No middle ear or mastoid effusion.    BONES/SOFT TISSUES: No acute abnormality.    HEAD CTV:  Limited due to poor contrast bolus.    DURAL SINUSES: Acute thrombus mildly distends the superior sagittal sinus and appears to involve several of the distal cortical veins draining into the superior sagittal sinus over the cerebral convexity. Probable thrombosis of the right transverse and   sigmoid sinuses. There appears to have been partial recanalization of the left transverse and sigmoid sinuses since the prior studies and the visualized portions of the proximal left internal jugular vein now appear patent. The straight sinus and vein of   Tyler appear patent.     INTERNAL CEREBRAL VEINS: No significant stenosis or occlusion.        Impression    IMPRESSION:   HEAD CT:  1.  Increased attenuation in the superior sagittal and right transverse and sigmoid sinuses compatible with acute dural venous sinus thrombosis.  2.  No acute intracranial hemorrhage.  3.  Encephalomalacia bilateral frontal and temporal lobes corresponding to areas of hemorrhagic contusion seen on the prior MRI.    HEAD CTV:   1.  Limited examination.  2.  Dural venous sinus thrombosis involving the superior sagittal sinus as well as several of the distal cortical veins draining into the superior sagittal sinus over the cerebral convexity.  3.  Probable acute thrombus right transverse and sigmoid sinuses.  4.  Partial recanalization of the left transverse and sigmoid sinuses.    5.  The findings were discussed directly with Dr. Steward at 11:15 PM CST on 10/08/2024.   MR Brain w/o & w Contrast     Narrative    MRI BRAIN WITHOUT AND WITH CONTRAST  10/9/2024 10:20 AM    HISTORY:  DVST; hypercoagulable off of AC. Headache; Chronic HA (>= 3  months); Chronic HA (>= 3 months), no complicating feature.     TECHNIQUE:  Multiplanar, multisequence MRI of the head and MRV of the  head without and with 10 mL Gadavist.     COMPARISON: Head CT 10/9/2024.    FINDINGS:    MRI HEAD:    Abnormal dural venous sinuses as per below.    Few scattered nonspecific white matter T2 hyperintensities presumably  represent mild chronic small vessel ischemic change. No evidence of  acute ischemia, acute hemorrhage, mass, or hydrocephalus.    Small volume bifrontal encephalomalacia, right worse in left, likely  related to remote trauma. Marrow signal is within normal limits. The  visualized paranasal sinuses, tympanic cavities, mastoid cavities  appear relatively well aerated.    MRV HEAD:    Occlusive dural venous sinus thrombosis involving the entire superior  sagittal sinus, right transverse sinus, right sigmoid sinus, and right  jugular bulb. No significant contrast opacification or flow-related  enhancement within the visualized right superior internal jugular  vein. Probable small volume nonocclusive thrombus within the left  transverse sinus. Small volume thrombus may extend into cortical veins  adjacent to the superior sagittal sinus, particularly on the left.      Impression    IMPRESSION:  1. Acute occlusive dural venous sinus thrombosis involving the  superior sagittal sinus, right transverse sinus, and right sigmoid  sinus. Probable smaller nonocclusive thrombus within the left  transverse sinus.  2. No evidence of acute ischemia or hemorrhage.  3. Small volume bifrontal encephalomalacia, right worse on left,  likely related to remote trauma.  4. Few scattered nonspecific white matter T2 hyperintensities  presumably represent mild chronic small vessel ischemic change.    GRIS BROTHERS MD         SYSTEM ID:  [id number]   MRV  Brain wo & w Contrast    Narrative    MRI BRAIN WITHOUT AND WITH CONTRAST  10/9/2024 10:20 AM    HISTORY:  DVST; hypercoagulable off of AC. Headache; Chronic HA (>= 3  months); Chronic HA (>= 3 months), no complicating feature.     TECHNIQUE:  Multiplanar, multisequence MRI of the head and MRV of the  head without and with 10 mL Gadavist.     COMPARISON: Head CT 10/9/2024.    FINDINGS:    MRI HEAD:    Abnormal dural venous sinuses as per below.    Few scattered nonspecific white matter T2 hyperintensities presumably  represent mild chronic small vessel ischemic change. No evidence of  acute ischemia, acute hemorrhage, mass, or hydrocephalus.    Small volume bifrontal encephalomalacia, right worse in left, likely  related to remote trauma. Marrow signal is within normal limits. The  visualized paranasal sinuses, tympanic cavities, mastoid cavities  appear relatively well aerated.    MRV HEAD:    Occlusive dural venous sinus thrombosis involving the entire superior  sagittal sinus, right transverse sinus, right sigmoid sinus, and right  jugular bulb. No significant contrast opacification or flow-related  enhancement within the visualized right superior internal jugular  vein. Probable small volume nonocclusive thrombus within the left  transverse sinus. Small volume thrombus may extend into cortical veins  adjacent to the superior sagittal sinus, particularly on the left.      Impression    IMPRESSION:  1. Acute occlusive dural venous sinus thrombosis involving the  superior sagittal sinus, right transverse sinus, and right sigmoid  sinus. Probable smaller nonocclusive thrombus within the left  transverse sinus.  2. No evidence of acute ischemia or hemorrhage.  3. Small volume bifrontal encephalomalacia, right worse on left,  likely related to remote trauma.  4. Few scattered nonspecific white matter T2 hyperintensities  presumably represent mild chronic small vessel ischemic change.    GRIS BROTHERS MD          SYSTEM ID:  [id number]   Echocardiogram Complete     Value    LVEF  50-55%    Biplane LVEF 50%    Narrative    [id number]  [id number]  [id number]  [id number]^CHYNA^AISHWARYA^STACY     [facility]  Echocardiography Laboratory  [address] [place] [address]     Name: FELIZ HERNANDEZ  MRN: [id number]  : 1980  Study Date: 10/09/2024 10:26 AM  Age: 44 yrs  Gender: Male  Patient Location: [facility]  Reason For Study: CVA  Ordering Physician: AISHWARYA CLEMENTE  Referring Physician: Clinic, [facility]  Performed By: Vera Stewart     BSA: 2.2 m2  Height: 73 in  Weight: 205 lb  HR: 41  BP: 127/58 mmHg  ______________________________________________________________________________  Procedure  Complete Portable Bubble Echo Adult.  ______________________________________________________________________________  Interpretation Summary     1. The left ventricle is mildly dilated. There is normal left ventricular wall  thickness. Left ventricular systolic function is low normal. The visual  ejection fraction is 50-55%. Biplane LVEF is 50%. Diastolic Doppler findings  (E/E' ratio and/or other parameters) suggest left ventricular filling  pressures are normal.  2. The right ventricle is normal size. A probable calcified papillary muscle  is noted in the right ventricle. Further evaluation with an outpatient cardiac  MRI could be performed if clinically indicated. The right ventricular systolic  function is normal.  3. The left atrium is severely dilated. The right atrium is mildly dilated.  There is no color Doppler evidence of an atrial shunt. A contrast injection  (Bubble Study) was performed that was negative for flow across the interatrial  septum.  4. Trace mitral and tricuspid regurgitation.  5. No pericardial effusion.  6. No previous study for comparison.  ______________________________________________________________________________  Left Ventricle  The left ventricle is mildly dilated.  There is normal left ventricular wall  thickness. Left ventricular systolic function is low normal. The visual  ejection fraction is 50-55%. Biplane LVEF is 50%. Diastolic Doppler findings  (E/E' ratio and/or other parameters) suggest left ventricular filling  pressures are normal.     Right Ventricle  The right ventricle is normal size. A probable calcified papillary muscle is  noted in the right ventricle. Further evaluation with an outpatient cardiac  MRI could be performed if clinically indicated. The right ventricular systolic  function is normal.     Atria  The left atrium is severely dilated. The right atrium is mildly dilated. There  is no color Doppler evidence of an atrial shunt. A contrast injection (Bubble  Study) was performed that was negative for flow across the interatrial septum.     Mitral Valve  There is trace mitral regurgitation.     Tricuspid Valve  There is trace tricuspid regurgitation. Right ventricular systolic pressure  could not be approximated due to inadequate tricuspid regurgitation.     Aortic Valve  There is mild trileaflet aortic sclerosis. No aortic regurgitation is present.  No aortic stenosis is present.     Pulmonic Valve  There is trace pulmonic valvular regurgitation. There is no pulmonic valvular  stenosis.     Vessels  The aortic root is normal size. Normal size ascending aorta. Descending aortic  velocity normal. The inferior vena cava is normal.     Pericardium  There is no pericardial effusion.     Rhythm  The rhythm was sinus bradycardia.  ______________________________________________________________________________  MMode/2D Measurements & Calculations  IVSd: 1.0 cm     LVIDd: 5.9 cm  LVIDs: 4.0 cm  LVPWd: 1.1 cm  FS: 31.4 %  LV mass(C)d: 258.2 grams  LV mass(C)dI: 118.7 grams/m2  Ao root diam: 3.3 cm  LA dimension: 4.5 cm  asc Aorta Diam: 3.7 cm  LA/Ao: 1.4  LVOT diam: 2.1 cm  LVOT area: 3.4 cm2  Ao root diam index Ht(cm/m): 1.8  Ao root diam index BSA (cm/m2):  1.5  Asc Ao diam index BSA (cm/m2): 1.7  Asc Ao diam index Ht(cm/m): 2.0  EF Biplane: 49.9 %  LA Volume (BP): 116.0 ml     LA Volume Index (BP): 53.5 ml/m2  LA Volume Indexed (AL/bp): 58.4 ml/m2  RV Base: 3.1 cm  RWT: 0.38  TAPSE: 2.1 cm     Doppler Measurements & Calculations  MV E max nehemias: 60.7 cm/sec  MV A max nehemias: 49.6 cm/sec  MV E/A: 1.2  MV dec slope: 226.8 cm/sec2  MV dec time: 0.27 sec  PA acc time: 0.17 sec  E/E' av.5  Lateral E/e': 5.7  Medial E/e': 7.3  RV S Nehemias: 14.4 cm/sec     ______________________________________________________________________________  Report approved by: Nayeli Ponce 10/09/2024 11:58 AM           History of Present Illness   44 year old male with anti-phospholipid syndrome, recurrent DVT/PE, dural venous sinus thrombosis, traumatic SAH/SDH in 2024, and substance use disorder presented to the ED with headache.  The pain began  behind his right eye.  Yesterday he sneezed and the pain became acutely worse.  He denies vision changes, fever, chills, focal weakness, vomiting, numbness, tingling.       Developed right sided headache x 2 days ago with associated nausea and dizziness. He is supposed to be taking life-long warfarin but stopped several weeks ago when he ran out of medication. Denies extremity weakness, numbness, tingling, or vision changes.    Hospital Course     Acute dural venous thrombosis  H/o antiphospholipid syndrome (APLA)  Chronic anticoagulation on warfarin  Subtherapeutic INR  Hypertension    Antiphospholipid syndrome h/o recurrent DVT/PE, CVA, and dural venous sinus thrombosis. Managed PTA with warfarin, however has not been compliant and has not taken warfarin for about 3 weeks. He had run out of medication and did not have motivation to refill partly due to depression. Has been on DOAC in past, however remains on warfarin as this is more efficacious for APLA. INR 1.0. Developed progressive right sided headache x 2 days ago with associated nausea and  dizziness. No focal neurological deficits. CT/CTA head suggests acute sinus thrombosis potentially involving the cortical veins. Further evaluated with MRI with MRV per recommendation of neurology. Echocardiogram LVEF 50-55%, mild LV dilation, normal LV thickness, severe LA dilation, RA mildly dilated, and bubble study negative. Warfarin restarted with heparin bridge. Evaluated by vascular neurology 10/9. BP goal <160 with gradual reduction of BP to normotension with long term goal <130/80. Blood pressure initially elevated but has been within goal since admission. He has been on Lovenox in past and is comfortable with home injections. Decision to continue warfarin with Lovenox bridge. Lovenox given prior to discharge after discontinuation of heparin. He is to follow-up with PCP in next 1-2 weeks and follow-up with general neurology or stroke [facility]ON after MRI/MRV in 8-12 weeks (ordered by neurology)    Depression    Chronic. Endorses depressed mood. Started on fluoxetine. Follow-up with PCP for further management.    Pending Results   Unresulted Labs Ordered in the Past 30 Days of this Admission       No orders found for last 31 day(s).          Physical Exam   Temp:  [97.8  F (36.6  C)-98.4  F (36.9  C)] 98.4  F (36.9  C)  Pulse:  [43-69] 58  Resp:  [11-28] 18  BP: ()/() 130/88  SpO2:  [97 %-100 %] 100 %  Vitals:    10/08/24 2135 10/08/24 2340 10/09/24 0322   Weight: 90.7 kg (200 lb) 92.9 kg (204 lb 12.8 oz) 93 kg (205 lb 0.4 oz)     Constitutional: Middle aged male resting comfortably in bed, alert, oriented, cooperative, polite, pleasant, no distress.  CV: Regular rate and rhythm.   Respiratory: CTA bilaterally. Normal respiratory effort on RA. Speaking in full sentences.  GI: Soft, nontender  Skin: Warm and dry  Musculoskeletal: Moving extremities appropriately  Neuro: CN II-XII grossly intact. PERRLA. Speech is clear in fluent. No pronator drift. UE strength 5/5 bilaterally to elbow flexion/extension  and shoulder adduction. LE strenth 5/5 bilaterally to knee flexion/extension and hip flexion.    The discharge plan was discussed with the patient and Dr. Abarca.    Total time on this discharge was 50 minutes.    TEE MIRELES PA-C

## 2024-10-09 NOTE — PLAN OF CARE
[place] [facility] DISCHARGE NOTE    Patient discharged to home at 4:58 PM via ambulation. Accompanied by mother and staff. Discharge instructions reviewed with patient, opportunity offered to ask questions. Prescriptions sent to patients preferred pharmacy. All belongings sent with patient.    Sheryl Singh RN

## 2024-10-09 NOTE — DISCHARGE INSTRUCTIONS
Take Warfarin 7.5 mg (one tablet) daily until INR recheck on Friday, 10/11/24.  [facility] to provide further dosing after INR resulted.

## 2024-10-09 NOTE — CONSULTS
"[facility]    Stroke Consult Note    Reason for Consult:  DVST    Chief Complaint: Headache       HPI  Jaspreet Brooks is a 44 year old male with PMH of recurrent DVT/PE (2015, 2016, 2019, 2024), APLAs on coumadin, recurrent difficulty with coumadin compliance, prior stroke (2015; R MCA, also had incidental chronic L ICA dissection identified at this time), traumatic SAH/SDH + occipital fracture + duel venous thrombosis (June 2024), alcohol and cannabis abuse, depression. He presented to the ED 10/8/24 for evaluation of 2 days of headache worsening following sneezing. CT/CTV with DVST of the superior sagittal sinus, distal cortical veins and bilateral transverse sigmoid sinus.  He noted being off of his AC for the past few weeks, INR in the ED was 1.00. He was started on high intensity heparin gtt with plan to transition to coumadin and admitted.    Today, Jaspreet is accompanied by his mother. He reports that his headache is feeling improved. He notes that headache pain is primarily around and behind his R eye. It started 2 days ago and was moderate, but more severe if he laid down or bent forward. He does not feel symptoms are positional today. He has had some nausea and vomiting over the past couple of days, but none today. He denies visual symptoms.    In regards to his coumadin he reports he was not taking it because it \"slacked on it.\" He reports that he takes it for a while and feels better so then feels like he no longer needs it. He denies difficulty getting regularly refills. He reports that he is currently not driving but he has someone who can get him to appointments and pharmacy pickups. He does not feed regular INR testing is a barrier. He does think that depression may be contributing to his lack of motivation to take the medication regularly. Ultimately he feels that he is able to resume coumadin.    He does endorse depression but expresses interest in trying an " antidepressant. He is not interested in talk therapy as he is concerned it may not be covered by insurance. He reports he has been sober and clean from alcohol and cocaine since his accident in June. He uses marijuana daily.     Stroke Evaluation Summarized    MRI/Head CT MRI: no acte ischemia or hemorrhage; R>L bifrontal encephalomalacia   Intracranial Vasculature MRV: when compared to images from June 2024 there is a new acute occlusive R transverse sinus thrombus and improvement in previously identified L transverse sinus thrombus (now partially occlusive) and ongoing/known thrombosis involving the superior sagittal sinus    CTV:   1.  Limited examination.  2.  Dural venous sinus thrombosis involving the superior sagittal sinus as well as several of the distal cortical veins draining into the superior sagittal sinus over the cerebral convexity.  3.  Probable acute thrombus right transverse and sigmoid sinuses.  4.  Partial recanalization of the left transverse and sigmoid sinuses.     Echocardiogram LVEF 50-55%, mild LV dilation, normal LV thickness, severe LA dilation, RA mildly dilated, bubble study negative        Impression  Acute R transverse sinus thrombosis, ongoing/known thromboses of the L transverse sinus, superior sagittal sinus in the setting of APLA not on anticoagulation (patient self discontinued coumadin)     Recommendations   -Neurochecks and vital signs Q 4 hours  -reinitiate coumadin with heparin bridge  -Inpatient BP goal <160 with gradual reduction of BP to normotension; long term outpatient goal BP is <130/80 with tighter control associated with improved vascular outcomes  - continued sobriety from alcohol and cocaine  - recommend avoidance of tobacco and marijuana  - patient is agreeable to trial of antidepressant but declines therapy referral at this time; discussed trial of antidepressant with hospitalist     Patient Follow-up    - in the next 1-2 week(s) with PCP  - in 8-12 weeks with  "general neurology or stroke SHANNON after MRI/MRV, ordered   -repeat MRI/MRV in 8-12 weeks, ordered     Thank you for this consult. No further stroke evaluation is recommended, so we will sign off. Please contact us with any additional questions.    ANGIE Lopez CNP  Vascular Neurology    To page me or covering stroke neurology team member, click here: AMCOM  Choose \"On Call\" tab at top, then select \"NEUROLOGY/ALL SITES\" from middle drop-down box, press Enter, then look for \"stroke\" or \"telestroke\" for your site.  _____________________________________________________    Clinically Significant Risk Factors Present on Admission                # Drug Induced Coagulation Defect: home medication list includes an anticoagulant medication            # Overweight: Estimated body mass index is 27.05 kg/m  as calculated from the following:    Height as of this encounter: 1.854 m (6' 1\").    Weight as of this encounter: 93 kg (205 lb 0.4 oz).              Past Medical History    No past medical history on file.  Medications   Home Meds  Prior to Admission medications    Medication Sig Start Date End Date Taking? Authorizing Provider   warfarin ANTICOAGULANT (COUMADIN) 7.5 MG tablet Take 7.5 mg by mouth daily. 8/15/24  Yes Reported, Patient       Scheduled Meds  Current Facility-Administered Medications   Medication Dose Route Frequency Provider Last Rate Last Admin    sodium chloride (PF) 0.9% PF flush 3 mL  3 mL Intracatheter Q8H Helen Jones MD        Warfarin Dose Required Daily - Pharmacist Managed  1 each Oral See Admin Instructions Helen Jones MD        warfarin-No DOSE today  1 each Does not apply no dose today (warfarin) Shantanu Abarca MD           Infusion Meds  Current Facility-Administered Medications   Medication Dose Route Frequency Provider Last Rate Last Admin    heparin 25,000 units in 0.45% NaCl 250 mL ANTICOAGULANT infusion  0-5,000 Units/hr Intravenous Continuous Claudy Steward MD 16.5 " mL/hr at 10/09/24 1247 1,650 Units/hr at 10/09/24 1247    Patient is already receiving anticoagulation with heparin, enoxaparin (LOVENOX), warfarin (COUMADIN)  or other anticoagulant medication   Does not apply Continuous PRN Helen Jones MD           Allergies   No Known Allergies       PHYSICAL EXAMINATION   Temp:  [97.8  F (36.6  C)-98.4  F (36.9  C)] 98.4  F (36.9  C)  Pulse:  [43-69] 58  Resp:  [11-28] 18  BP: ()/() 130/88  SpO2:  [97 %-100 %] 100 %    Neuro Exam  Mental Status:  alert, oriented x 3, follows commands, speech clear and fluent, naming and repetition normal  Cranial Nerves:  visual fields intact (tested by nurse), EOMI with normal smooth pursuit, facial sensation intact and symmetric (tested by nurse), facial movements symmetric, hearing not formally tested but intact to conversation, no dysarthria, shoulder shrug equal bilaterally, tongue protrusion midline  Motor:  no abnormal movements, able to move all limbs antigravity spontaneously with no signs of hemiparesis observed, no pronator drift  Reflexes:  unable to test (telestroke)  Sensory:  light touch sensation intact and symmetric throughout upper and lower extremities (assessed by nurse), no extinction on double simultaneous stimulation (assessed by nurse)  Coordination:  normal finger-to-nose and heel-to-shin bilaterally without dysmetria  Station/Gait:  unable to test due to telestroke    Stroke Scales    NIHSS  1a. Level of Consciousness 0-->Alert, keenly responsive   1b. LOC Questions 0-->Answers both questions correctly   1c. LOC Commands 0-->Performs both tasks correctly   2.   Best Gaze 0-->Normal   3.   Visual 0-->No visual loss   4.   Facial Palsy 0-->Normal symmetrical movements   5a. Motor Arm, Left 0-->No drift, limb holds 90 (or 45) degrees for full 10 secs   5b. Motor Arm, Right 0-->No drift, limb holds 90 (or 45) degrees for full 10 secs   6a. Motor Leg, Left 0-->No drift, leg holds 30 degree position for  "full 5 secs   6b. Motor Leg, right 0-->No drift, leg holds 30 degree position for full 5 secs   7.   Limb Ataxia 0-->Absent   8.   Sensory 0-->Normal, no sensory loss   9.   Best Language 0-->No aphasia, normal   10. Dysarthria 0-->Normal   11. Extinction and Inattention  0-->No abnormality   Total 0 (10/09/24 1334)       Imaging  I personally reviewed all imaging; relevant findings per HPI.    Labs Data   CBC  Recent Labs   Lab 10/08/24  2151   WBC 10.5   RBC 4.74   HGB 14.5   HCT 41.5        Basic Metabolic Panel   Recent Labs   Lab 10/08/24  2151      POTASSIUM 3.8   CHLORIDE 102   CO2 21*   BUN 17.2   CR 0.72   *   CAMPOS 9.5     Liver Panel  No results for input(s): \"PROTTOTAL\", \"ALBUMIN\", \"BILITOTAL\", \"ALKPHOS\", \"AST\", \"ALT\", \"BILIDIRECT\" in the last 168 hours.  INR    Recent Labs   Lab Test 10/09/24  1250 10/08/24  2151   INR 0.98 1.00           Stroke Consult Data Data   Telestroke Service Details  (for non-emergent stroke consult with tele)  Video start time 10/09/24   1330   Video end time 10/09/24   1401   Type of service telemedicine diagnostic assessment of acute neurological changes   Reason telemedicine is appropriate patient requires assessment with a specialist for diagnosis and treatment of neurological symptoms   Mode of transmission secure interactive audio and video communication per Tasia   Originating site (patient location) [facility]    Distant site (provider location) [facility]       I have personally spent a total of 75 minutes providing care today, time spent in reviewing medical records and devising the plan as recorded above.   "

## 2024-10-09 NOTE — CONSULTS
"[facility]    Stroke Telephone Note    I was called by Claudy Steward on 10/09/24 regarding patient Jaspreet Brooks. The patient is a 44 year old male with APLAS, DVT, sinus venous thrombosis, alcohol and cannabis abuse, and cerebral venous thrombosis who presents with headache.  He is supposed to be on  Coumadin, but apparently has stopped it in the past.  INR today is 1.0.    Head CT/CTA suggests acute sinus thrombosis potentially involving the cortical veins.  The radiology made this read in comparison to ouside scans which we do not have access to.    Vitals  BP: (!) 148/64   Pulse: 51   Resp: 28   Temp: 97.9  F (36.6  C)   Weight: 92.9 kg (204 lb 12.8 oz)    Imaging Findings  CT head: hyperdensity suggesting sinus venous thrombosis  CTV head/neck: acute frontal sinus thrombosis    Impression  Cerebral vein thrombosis  APLS  Coumadin prescribed, INR 1.0    Recommendations  High intensity heparin gtt with boluses initiated prior to consult  --transition to Coumadin when ready  SBP < 140 cap  MRI with MRV--if extensive cortical vein thromboses consider empiric anti seizure medications as these are quite epileptogenic--early AM 10/9  Q4 neurochecks  Ok to admit to [facility], however, if they request transfer [facility] would be reasonable    My recommendations are based on the information provided over the phone by Jaspreet Brooks's in-person providers. They are not intended to replace the clinical judgment of his in-person providers. I was not requested to personally see or examine the patient at this time.     Farhat Bucio MD, MS  Vascular Neurology    To page me or covering stroke neurology team member, click here: AMCOM  Choose \"On Call\" tab at top, then select \"NEUROLOGY/ALL SITES\" from middle drop-down box, press Enter, then look for \"stroke\" or \"telestroke\" for your site.         "

## 2024-10-09 NOTE — ED NOTES
[facility]   Admission Handoff    The patient is Jaspreet Brooks, 44 year old who arrived in the ED by CAR from home with a complaint of Headache  . The patient's current symptoms are new and during this time the symptoms have decreased. In the ED, patient was diagnosed with   Final diagnoses:   Dural venous sinus thrombosis         Needed?: No    Allergies:  No Known Allergies    Past Medical Hx: No past medical history on file.    Initial vitals were: BP: (!) 148/92  Pulse: 69  Temp: 97.9  F (36.6  C)  Resp: 18  Weight: 90.7 kg (200 lb)  SpO2: 98 %   Recent vital Signs: /89   Pulse 64   Temp 97.9  F (36.6  C) (Oral)   Resp 12   Wt 92.9 kg (204 lb 12.8 oz)   SpO2 98%   BMI 27.02 kg/m      Elimination Status: Continent: Yes     Activity Level: Independent    Fall Status: Reason for falls risk: High Risk Medications  nonskid shoes/slippers when out of bed, arm band in place, patient and family education, and assistive device/personal items within reach    Baseline Mental status: WDL  Current Mental Status changes: at basesline    Infection present or suspected this encounter: no  Sepsis suspected: No    Isolation type: n/a    Bariatric equipment needed?: No    In the ED these meds were given:   Medications   heparin 25,000 units in 0.45% NaCl 250 mL ANTICOAGULANT infusion ( Intravenous Canceled Entry 10/8/24 2348)   HYDROmorphone (PF) (DILAUDID) injection 0.5 mg (0.5 mg Intravenous $Given 10/8/24 2342)   metoclopramide (REGLAN) 10 mg in sodium chloride 0.9 % 50 mL infusion (10 mg Intravenous $Given 10/8/24 2251)   acetaminophen (TYLENOL) tablet 1,000 mg (1,000 mg Oral $Given 10/8/24 2250)   iopamidol (ISOVUE-370) solution 67 mL (67 mLs Intravenous $Given 10/8/24 2224)   sodium chloride 0.9 % bag 500mL for CT scan flush use (100 mLs As instructed $Given 10/8/24 2224)   heparin ANTICOAGULANT Loading dose for HIGH INTENSITY TREATMENT * Give BEFORE starting heparin  infusion (7,250 Units Intravenous $Given 10/8/24 7831)       Drips running?  Yes    Home pump  No    Current LDAs: Peripheral IV: Site left AC; Gauge 18g  none     Results:   Labs/Imaging  Ordered and Resulted from Time of ED Arrival Up to the Time of Departure from the ED  Results for orders placed or performed during the hospital encounter of 10/08/24 (from the past 24 hour(s))   INR   Result Value Ref Range    INR 1.00 0.85 - 1.15   [place] Draw    Narrative    The following orders were created for panel order [place] Draw.  Procedure                               Abnormality         Status                     ---------                               -----------         ------                     Extra Red Top Tube[[id number]]                               Final result               Extra Green Top (Lithium...[[id number]]                      Final result               Extra Purple Top Tube[[id number]]                            Final result                 Please view results for these tests on the individual orders.   Extra Red Top Tube   Result Value Ref Range    Hold Specimen JIC    Extra Green Top (Lithium Heparin) Tube   Result Value Ref Range    Hold Specimen     Extra Purple Top Tube   Result Value Ref Range    Hold Specimen JIC    CBC with platelets   Result Value Ref Range    WBC Count 10.5 4.0 - 11.0 10e3/uL    RBC Count 4.74 4.40 - 5.90 10e6/uL    Hemoglobin 14.5 13.3 - 17.7 g/dL    Hematocrit 41.5 40.0 - 53.0 %    MCV 88 78 - 100 fL    MCH 30.6 26.5 - 33.0 pg    MCHC 34.9 31.5 - 36.5 g/dL    RDW 13.0 10.0 - 15.0 %    Platelet Count 251 150 - 450 10e3/uL   Head CT w/o contrast    Narrative    EXAM: CTV HEAD WITH CONTRAST, CT HEAD W/O CONTRAST  LOCATION: [facility]  DATE: 10/8/2024    INDICATION: Periorbital headache, history of cavernous sinus thrombosis, noncompliant with warfarin  COMPARISON: MR/MRV of the head dated 06/21/2024 and head CT dated 07/22/2024  CONTRAST: 67 ml  Isovue 370  TECHNIQUE: Head CT venogram with IV contrast. Noncontrast head CT followed by axial helical CT images of the intracranial vessels obtained during the venous phase of intravenous contrast administration. Axial helical 2D reconstructed images and   multiplanar 3D MIP reconstructed images of the intracranial vessels were performed by the technologist. Dose reduction techniques were used.     FINDINGS:   NONCONTRAST HEAD CT:   INTRACRANIAL CONTENTS: No intracranial hemorrhage, extraaxial collection, or mass effect.  No CT evidence of acute infarct. Encephalomalacia bilateral orbitofrontal region, right greater than left, and involving the bilateral gyri recti corresponding to   areas of hemorrhagic contusion seen on the prior MRI. Very mild encephalomalacia inferior right and lateral left temporal lobes also corresponding to hemorrhagic contusion seen on the prior MRI. Normal ventricles and sulci. Diffusely increased   attenuation within the superior sagittal sinus and right transverse and sigmoid sinuses.    VISUALIZED ORBITS/SINUSES/MASTOIDS: No intraorbital abnormality. No paranasal sinus mucosal disease. No middle ear or mastoid effusion.    BONES/SOFT TISSUES: No acute abnormality.    HEAD CTV:  Limited due to poor contrast bolus.    DURAL SINUSES: Acute thrombus mildly distends the superior sagittal sinus and appears to involve several of the distal cortical veins draining into the superior sagittal sinus over the cerebral convexity. Probable thrombosis of the right transverse and   sigmoid sinuses. There appears to have been partial recanalization of the left transverse and sigmoid sinuses since the prior studies and the visualized portions of the proximal left internal jugular vein now appear patent. The straight sinus and vein of   Tyler appear patent.     INTERNAL CEREBRAL VEINS: No significant stenosis or occlusion.        Impression    IMPRESSION:   HEAD CT:  1.  Increased attenuation in the  superior sagittal and right transverse and sigmoid sinuses compatible with acute dural venous sinus thrombosis.  2.  No acute intracranial hemorrhage.  3.  Encephalomalacia bilateral frontal and temporal lobes corresponding to areas of hemorrhagic contusion seen on the prior MRI.    HEAD CTV:   1.  Limited examination.  2.  Dural venous sinus thrombosis involving the superior sagittal sinus as well as several of the distal cortical veins draining into the superior sagittal sinus over the cerebral convexity.  3.  Probable acute thrombus right transverse and sigmoid sinuses.  4.  Partial recanalization of the left transverse and sigmoid sinuses.    5.  The findings were discussed directly with Dr. Steward at 11:15 PM CST on 10/08/2024.   CTV Head with Contrast    Narrative    EXAM: CTV HEAD WITH CONTRAST, CT HEAD W/O CONTRAST  LOCATION: [facility]  DATE: 10/8/2024    INDICATION: Periorbital headache, history of cavernous sinus thrombosis, noncompliant with warfarin  COMPARISON: MR/MRV of the head dated 06/21/2024 and head CT dated 07/22/2024  CONTRAST: 67 ml Isovue 370  TECHNIQUE: Head CT venogram with IV contrast. Noncontrast head CT followed by axial helical CT images of the intracranial vessels obtained during the venous phase of intravenous contrast administration. Axial helical 2D reconstructed images and   multiplanar 3D MIP reconstructed images of the intracranial vessels were performed by the technologist. Dose reduction techniques were used.     FINDINGS:   NONCONTRAST HEAD CT:   INTRACRANIAL CONTENTS: No intracranial hemorrhage, extraaxial collection, or mass effect.  No CT evidence of acute infarct. Encephalomalacia bilateral orbitofrontal region, right greater than left, and involving the bilateral gyri recti corresponding to   areas of hemorrhagic contusion seen on the prior MRI. Very mild encephalomalacia inferior right and lateral left temporal lobes also corresponding to  hemorrhagic contusion seen on the prior MRI. Normal ventricles and sulci. Diffusely increased   attenuation within the superior sagittal sinus and right transverse and sigmoid sinuses.    VISUALIZED ORBITS/SINUSES/MASTOIDS: No intraorbital abnormality. No paranasal sinus mucosal disease. No middle ear or mastoid effusion.    BONES/SOFT TISSUES: No acute abnormality.    HEAD CTV:  Limited due to poor contrast bolus.    DURAL SINUSES: Acute thrombus mildly distends the superior sagittal sinus and appears to involve several of the distal cortical veins draining into the superior sagittal sinus over the cerebral convexity. Probable thrombosis of the right transverse and   sigmoid sinuses. There appears to have been partial recanalization of the left transverse and sigmoid sinuses since the prior studies and the visualized portions of the proximal left internal jugular vein now appear patent. The straight sinus and vein of   Tyler appear patent.     INTERNAL CEREBRAL VEINS: No significant stenosis or occlusion.        Impression    IMPRESSION:   HEAD CT:  1.  Increased attenuation in the superior sagittal and right transverse and sigmoid sinuses compatible with acute dural venous sinus thrombosis.  2.  No acute intracranial hemorrhage.  3.  Encephalomalacia bilateral frontal and temporal lobes corresponding to areas of hemorrhagic contusion seen on the prior MRI.    HEAD CTV:   1.  Limited examination.  2.  Dural venous sinus thrombosis involving the superior sagittal sinus as well as several of the distal cortical veins draining into the superior sagittal sinus over the cerebral convexity.  3.  Probable acute thrombus right transverse and sigmoid sinuses.  4.  Partial recanalization of the left transverse and sigmoid sinuses.    5.  The findings were discussed directly with Dr. Steward at 11:15 PM CST on 10/08/2024.       For the majority of the shift this patient's behavior was Green     Cardiac Rhythm: Normal Sinus  Pt  needs tele? No  Skin/wound Issues: None    Code Status: Full Code    Pain control: fair    Nausea control: pt had none    Abnormal labs/tests/findings requiring intervention: CT+ for multiple thrombosis    Patient tested for COVID 19 prior to admission: NO     OBS brochure/video discussed/provided to patient/family: N/A     Family present during ED course? Yes     Family Comments/Social Situation comments: mother at bedside    Tasks needing completion: None    Kari Harley RN

## 2024-10-09 NOTE — PLAN OF CARE
"Goal Outcome Evaluation:      Plan of Care Reviewed With: patient    Overall Patient Progress: no changeOverall Patient Progress: no change    /72 (BP Location: Right arm)   Pulse 54   Temp 99.2  F (37.3  C) (Oral)   Resp 16   Ht 1.575 m (5' 2\")   Wt 57.2 kg (126 lb)   LMP 08/16/2023   SpO2 97%   BMI 23.05 kg/m        Activity: up ad zechariah   Neuros: alert and orientated x 4, calm and cooperative   Cardiac: bradycardic into mid 50's otherwise WNL   Respiratory: O2 sats mid 90's on RA, unlabored    GI/: abdomen soft/tender.  No BM this shift.  Voiding spont   Diet: NPO   Lines: PIV   Incisions/Drains: n/a    Labs: reviewed   Pain/nausea: prn oxycodone and IV dilaudid with \"partial\" pain control    New changes this shift: none    Plan: possible ERCP today.  Continue POC               " Goal Outcome Evaluation:      Plan of Care Reviewed With: patient    Overall Patient Progress: no change    Patient is able to move independently, and is alert and oriented. Reported having a headache and requested pain medication. Patient is to have MRI later today.

## 2024-10-09 NOTE — PHARMACY
Clinical Pharmacy- Warfarin Discharge Note  This patient is currently on warfarin for the treatment of  Dural venous sinus thrombosis .  INR Goal= 2-3  Expected length of therapy lifetime.    Warfarin PTA Regimen: Pt states most recently has been taking 7.5mg daily (of note, admit INR 1.0).      Anticoagulation Dose History          Latest Ref Rng & Units 10/8/2024 10/9/2024   Recent Dosing and Labs   warfarin ANTICOAGULANT (COUMADIN) 7.5 MG tablet - - 7.5 mg, $Given   INR 0.85 - 1.15 1.00  0.98       Details                   Vitamin K doses administered during the last 7 days: none  FFP administered during the last 7 days: none  Recommend discharging the patient on a warfarin regimen of 7.5 mg daily with a prescription for warfarin 7.5mg tablets.      The patient should have an INR checked  Friday, October 11th at [facility] .    Leonor GibbsD.

## 2024-10-09 NOTE — H&P
[facility]    History and Physical - Hospitalist Service       Date of Admission:  10/8/2024    Assessment & Plan    Acute dural venous sinus thrombosis  Anti-phospholipid syndrome (recurrent DVT/PE, CVA, dural venous sinus thrombosis)  -continue heparin drip  -resume warfarin, pharmacy to dose  -MRV per Neuro to evaluate cortical vein thrombosis (if extensive, would need to consider prophylactic antiepileptic medication)  -neuro checks q4h    HTN  SBP goal <140mmHg          Diet:  regular  DVT Prophylaxis: heaprin drip  Dowd Catheter: Not present  Lines: None     Code Status:  FULL     Clinically Significant Risk Factors Present on Admission     # Drug Induced Coagulation Defect: home medication list includes an anticoagulant medication                       Disposition Plan      Expected Discharge Date: 10/11/2024                The patient's care was discussed with the Patient.        ELIZA VAZQUEZ MD  [facility]  Securely message with the Vocera Web Console (learn more here)  Text page via Droid system master Paging/Directory      Visit/Communication Style   Virtual (Video) communication was used to evaluate Jaspreet.  Jaspreet consented to the use of video communication: yes  Video START time: 0540, 10/9/2024  Video STOP time: 0550, 10/9/2024   Patient's location: [facility]   Provider's location during the visit: [facility] Tele-medicine site        ______________________________________________________________________    Chief Complaint   HA    History of Present Illness   44yoM with anti-phospholipid syndrome, recurrent DVT/PE, dural venous sinus thrombosis, traumatic SAH/SDH in 6/2024, and substance use disorder presented to the ED with headache.  The pain began  behind his right eye.  Yesterday he sneezed and the pain became acutely worse.  He denies vision changes, fever, chills, focal weakness, vomiting, numbness, tingling.      He  is supposed to be taking life-long warfarin but stopped several weeks ago when he ran out of medication.    Review of Systems    General: negative for fever, chills, sweats, weakness  Eyes: negative for blurred vision, loss of vision  Ear Nose and Throat: negative for pharyngitis, speech or swallowing difficulties  Respiratory:  negative for sputum production, wheezing, OROZCO, pleuritic pain, sob or cough  Cardiology:  negative for chest pain, palpitations, orthopnea, PND, edema, syncope   Gastrointestinal: negative for abdominal pain, , vomiting, diarrhea, constipation, hematemesis, melena or hematochezia  Genitourinary: negative for frequency, urgency, dysuria, hematuria   Neurological: negative for focal weakness, paresthesia    Past Medical History    APLS  DVT  PE  Dural venous sinus thrombosis  Suicide attempt  Traumatic SAH and SDH  Substance abuse disorder    Past Surgical History   I have reviewed this patient's surgical history and updated it with pertinent information if needed.  Past Surgical History:   Procedure Laterality Date    IR CAROTID CEREBRAL ANGIOGRAM BILATERAL  4/29/2015       Social History   I have reviewed this patient's social history and updated it with pertinent information if needed.  Social History     Tobacco Use    Smoking status: Never   Substance Use Topics    Alcohol use: No    Drug use: No       Family History       Prior to Admission Medications   Prior to Admission Medications   Prescriptions Last Dose Informant Patient Reported? Taking?   warfarin ANTICOAGULANT (COUMADIN) 7.5 MG tablet   Yes Yes   Sig: Take 7.5 mg by mouth daily.      Facility-Administered Medications: None     Allergies   No Known Allergies    Physical Exam   Vital Signs: Temp: 97.9  F (36.6  C) Temp src: Oral BP: 131/87 Pulse: 59   Resp: 25 SpO2: 97 % O2 Device: None (Room air)    Weight: 204 lbs 12.8 oz    Gen:  Well-developed, well-nourished, in no acute distress, lying semi-supine in hospital  stretcher  HEENT:  Anicteric sclera, PER, hearing intact to voice  Resp:  No accessory muscle use, breath sounds clear; no wheezes no rales no rhonchi  Card:  No murmur, normal S1, S2   Abd:  Soft per RN exam, no TTP, non-distended, normoactive bowel sounds are present  Musc:  Normal strength and movement of the major muscle groups without obvious deformity  Psych:  Good insight, oriented to person, place and time, not anxious, not agitated    Data     Recent Labs   Lab 10/08/24  2151   WBC 10.5   HGB 14.5   MCV 88      INR 1.00         Recent Results (from the past 24 hour(s))   Head CT w/o contrast    Narrative    EXAM: CTV HEAD WITH CONTRAST, CT HEAD W/O CONTRAST  LOCATION: [facility]  DATE: 10/8/2024    INDICATION: Periorbital headache, history of cavernous sinus thrombosis, noncompliant with warfarin  COMPARISON: MR/MRV of the head dated 06/21/2024 and head CT dated 07/22/2024  CONTRAST: 67 ml Isovue 370  TECHNIQUE: Head CT venogram with IV contrast. Noncontrast head CT followed by axial helical CT images of the intracranial vessels obtained during the venous phase of intravenous contrast administration. Axial helical 2D reconstructed images and   multiplanar 3D MIP reconstructed images of the intracranial vessels were performed by the technologist. Dose reduction techniques were used.     FINDINGS:   NONCONTRAST HEAD CT:   INTRACRANIAL CONTENTS: No intracranial hemorrhage, extraaxial collection, or mass effect.  No CT evidence of acute infarct. Encephalomalacia bilateral orbitofrontal region, right greater than left, and involving the bilateral gyri recti corresponding to   areas of hemorrhagic contusion seen on the prior MRI. Very mild encephalomalacia inferior right and lateral left temporal lobes also corresponding to hemorrhagic contusion seen on the prior MRI. Normal ventricles and sulci. Diffusely increased   attenuation within the superior sagittal sinus and right  transverse and sigmoid sinuses.    VISUALIZED ORBITS/SINUSES/MASTOIDS: No intraorbital abnormality. No paranasal sinus mucosal disease. No middle ear or mastoid effusion.    BONES/SOFT TISSUES: No acute abnormality.    HEAD CTV:  Limited due to poor contrast bolus.    DURAL SINUSES: Acute thrombus mildly distends the superior sagittal sinus and appears to involve several of the distal cortical veins draining into the superior sagittal sinus over the cerebral convexity. Probable thrombosis of the right transverse and   sigmoid sinuses. There appears to have been partial recanalization of the left transverse and sigmoid sinuses since the prior studies and the visualized portions of the proximal left internal jugular vein now appear patent. The straight sinus and vein of   Tyler appear patent.     INTERNAL CEREBRAL VEINS: No significant stenosis or occlusion.        Impression    IMPRESSION:   HEAD CT:  1.  Increased attenuation in the superior sagittal and right transverse and sigmoid sinuses compatible with acute dural venous sinus thrombosis.  2.  No acute intracranial hemorrhage.  3.  Encephalomalacia bilateral frontal and temporal lobes corresponding to areas of hemorrhagic contusion seen on the prior MRI.    HEAD CTV:   1.  Limited examination.  2.  Dural venous sinus thrombosis involving the superior sagittal sinus as well as several of the distal cortical veins draining into the superior sagittal sinus over the cerebral convexity.  3.  Probable acute thrombus right transverse and sigmoid sinuses.  4.  Partial recanalization of the left transverse and sigmoid sinuses.    5.  The findings were discussed directly with Dr. Steward at 11:15 PM CST on 10/08/2024.   CTV Head with Contrast    Narrative    EXAM: CTV HEAD WITH CONTRAST, CT HEAD W/O CONTRAST  LOCATION: [facility]  DATE: 10/8/2024    INDICATION: Periorbital headache, history of cavernous sinus thrombosis, noncompliant with  warfarin  COMPARISON: MR/MRV of the head dated 06/21/2024 and head CT dated 07/22/2024  CONTRAST: 67 ml Isovue 370  TECHNIQUE: Head CT venogram with IV contrast. Noncontrast head CT followed by axial helical CT images of the intracranial vessels obtained during the venous phase of intravenous contrast administration. Axial helical 2D reconstructed images and   multiplanar 3D MIP reconstructed images of the intracranial vessels were performed by the technologist. Dose reduction techniques were used.     FINDINGS:   NONCONTRAST HEAD CT:   INTRACRANIAL CONTENTS: No intracranial hemorrhage, extraaxial collection, or mass effect.  No CT evidence of acute infarct. Encephalomalacia bilateral orbitofrontal region, right greater than left, and involving the bilateral gyri recti corresponding to   areas of hemorrhagic contusion seen on the prior MRI. Very mild encephalomalacia inferior right and lateral left temporal lobes also corresponding to hemorrhagic contusion seen on the prior MRI. Normal ventricles and sulci. Diffusely increased   attenuation within the superior sagittal sinus and right transverse and sigmoid sinuses.    VISUALIZED ORBITS/SINUSES/MASTOIDS: No intraorbital abnormality. No paranasal sinus mucosal disease. No middle ear or mastoid effusion.    BONES/SOFT TISSUES: No acute abnormality.    HEAD CTV:  Limited due to poor contrast bolus.    DURAL SINUSES: Acute thrombus mildly distends the superior sagittal sinus and appears to involve several of the distal cortical veins draining into the superior sagittal sinus over the cerebral convexity. Probable thrombosis of the right transverse and   sigmoid sinuses. There appears to have been partial recanalization of the left transverse and sigmoid sinuses since the prior studies and the visualized portions of the proximal left internal jugular vein now appear patent. The straight sinus and vein of   Tyler appear patent.     INTERNAL CEREBRAL VEINS: No significant  stenosis or occlusion.        Impression    IMPRESSION:   HEAD CT:  1.  Increased attenuation in the superior sagittal and right transverse and sigmoid sinuses compatible with acute dural venous sinus thrombosis.  2.  No acute intracranial hemorrhage.  3.  Encephalomalacia bilateral frontal and temporal lobes corresponding to areas of hemorrhagic contusion seen on the prior MRI.    HEAD CTV:   1.  Limited examination.  2.  Dural venous sinus thrombosis involving the superior sagittal sinus as well as several of the distal cortical veins draining into the superior sagittal sinus over the cerebral convexity.  3.  Probable acute thrombus right transverse and sigmoid sinuses.  4.  Partial recanalization of the left transverse and sigmoid sinuses.    5.  The findings were discussed directly with Dr. Steward at 11:15 PM CST on 10/08/2024.

## 2024-10-09 NOTE — ED PROVIDER NOTES
History     Chief Complaint   Patient presents with    Headache     HPI  Jaspreet Brooks is a 44 year old male who history of DVT on Coumadin, alcohol use disorder, antiphospholipid antibody syndrome, cannabis use disorder, history of cerebral venous thrombosis, history of subarachnoid hemorrhage, history of subdural hematoma who presents for evaluation of a headache.  Chart review shows that patient is supposed to be on lifelong Coumadin, but does have a history of stopping taking it.  Chart review shows that he is missed his last 3 anticoagulation visits.  Patient states that he is had a headache for the last 2 days.  He describes it as a pain behind his right eye.  He denies any vision changes.  No pain with moving his eyes.  He states that he did fall and hit his head yesterday, but was having a headache prior to that.  He states that he sneezed at work and his headache is been much more severe ever since then.  He has been taking ibuprofen without any improvement.  He denies nausea or vomiting.  Denies neck pain.  No weakness in the arms or legs.  No trouble walking.  No fever or recent illness.  Patient has not had his warfarin for the last couple of weeks.  He states that he ran out of it and was never able to get it refilled.  He states he does not have transportation right now and that is limited his ability to go to follow-up appointments.    Allergies:  No Known Allergies    Problem List:    Patient Active Problem List    Diagnosis Date Noted    Dural venous sinus thrombosis 10/09/2024     Priority: Medium    TIA (transient ischemic attack) 10/09/2019     Priority: Medium    History of CVA (cerebrovascular accident) 10/08/2019     Priority: Medium     Formatting of this note might be different from the original. CVA 4/28/2015      Chronic deep vein thrombosis (DVT) of right lower extremity, unspecified vein (H) 02/08/2019     Priority: Medium    Tobacco use disorder 02/08/2019     Priority: Medium     Alcohol consumption binge drinking 02/08/2019     Priority: Medium        Past Medical History:    No past medical history on file.    Past Surgical History:    Past Surgical History:   Procedure Laterality Date    IR CAROTID CEREBRAL ANGIOGRAM BILATERAL  4/29/2015       Family History:    No family history on file.    Social History:  Marital Status:  Single [1]  Social History     Tobacco Use    Smoking status: Never   Substance Use Topics    Alcohol use: No    Drug use: No        Medications:    warfarin ANTICOAGULANT (COUMADIN) 7.5 MG tablet          Review of Systems  See HPI  Physical Exam   BP: (!) 148/92  Pulse: 69  Temp: 97.9  F (36.6  C)  Resp: 18  Weight: 90.7 kg (200 lb)  SpO2: 98 %      Physical Exam  /87   Pulse 59   Temp 97.9  F (36.6  C) (Oral)   Resp 25   Wt 92.9 kg (204 lb 12.8 oz)   SpO2 97%   BMI 27.02 kg/m    General: Sitting in a dark room, uncomfortable appearing, but alert and answering questions appropriately  Head: atraumatic.  Temples are nontender to palpation bilaterally  Nose: no rhinorrhea or epistaxis  Ears: no external auditory canal discharge or bleeding.    Eyes: Sclera nonicteric. Conjunctiva noninjected. PERRL, EOMI.  Vision grossly intact  Mouth: no tonsillar erythema, edema, or exudate.  Moist mucous membranes  Neck: supple, moving spontaneously no midline cervical tenderness  Lungs: No increased work of breathing.  Clear to auscultation bilaterally.  CV: RRR, peripheral pulses palpable and symmetric  Abdomen: soft, nt, nd, no guarding or rebound.   Extremities: Warm and well-perfused.    Skin: no rash or diaphoresis  Neuro: CN II-XII grossly intact, strength 5/5 in UE and LEs bilaterally, sensation intact to light touch in UE and LEs bilaterally;     ED Course        Procedures             Critical Care time:  none             Results for orders placed or performed during the hospital encounter of 10/08/24 (from the past 24 hour(s))   INR   Result Value Ref Range     INR 1.00 0.85 - 1.15   [place] Draw    Narrative    The following orders were created for panel order [place] Draw.  Procedure                               Abnormality         Status                     ---------                               -----------         ------                     Extra Red Top Tube[[id number]]                               Final result               Extra Green Top (Lithium...[[id number]]                      Final result               Extra Purple Top Tube[[id number]]                            Final result                 Please view results for these tests on the individual orders.   Extra Red Top Tube   Result Value Ref Range    Hold Specimen JIC    Extra Green Top (Lithium Heparin) Tube   Result Value Ref Range    Hold Specimen     Extra Purple Top Tube   Result Value Ref Range    Hold Specimen JIC    CBC with platelets   Result Value Ref Range    WBC Count 10.5 4.0 - 11.0 10e3/uL    RBC Count 4.74 4.40 - 5.90 10e6/uL    Hemoglobin 14.5 13.3 - 17.7 g/dL    Hematocrit 41.5 40.0 - 53.0 %    MCV 88 78 - 100 fL    MCH 30.6 26.5 - 33.0 pg    MCHC 34.9 31.5 - 36.5 g/dL    RDW 13.0 10.0 - 15.0 %    Platelet Count 251 150 - 450 10e3/uL   Basic metabolic panel   Result Value Ref Range    Sodium 136 135 - 145 mmol/L    Potassium 3.8 3.4 - 5.3 mmol/L    Chloride 102 98 - 107 mmol/L    Carbon Dioxide (CO2) 21 (L) 22 - 29 mmol/L    Anion Gap 13 7 - 15 mmol/L    Urea Nitrogen 17.2 6.0 - 20.0 mg/dL    Creatinine 0.72 0.67 - 1.17 mg/dL    GFR Estimate >90 >60 mL/min/1.73m2    Calcium 9.5 8.8 - 10.4 mg/dL    Glucose 119 (H) 70 - 99 mg/dL   Head CT w/o contrast    Narrative    EXAM: CTV HEAD WITH CONTRAST, CT HEAD W/O CONTRAST  LOCATION: [facility]  DATE: 10/8/2024    INDICATION: Periorbital headache, history of cavernous sinus thrombosis, noncompliant with warfarin  COMPARISON: MR/MRV of the head dated 06/21/2024 and head CT dated 07/22/2024  CONTRAST: 67 ml Isovue  370  TECHNIQUE: Head CT venogram with IV contrast. Noncontrast head CT followed by axial helical CT images of the intracranial vessels obtained during the venous phase of intravenous contrast administration. Axial helical 2D reconstructed images and   multiplanar 3D MIP reconstructed images of the intracranial vessels were performed by the technologist. Dose reduction techniques were used.     FINDINGS:   NONCONTRAST HEAD CT:   INTRACRANIAL CONTENTS: No intracranial hemorrhage, extraaxial collection, or mass effect.  No CT evidence of acute infarct. Encephalomalacia bilateral orbitofrontal region, right greater than left, and involving the bilateral gyri recti corresponding to   areas of hemorrhagic contusion seen on the prior MRI. Very mild encephalomalacia inferior right and lateral left temporal lobes also corresponding to hemorrhagic contusion seen on the prior MRI. Normal ventricles and sulci. Diffusely increased   attenuation within the superior sagittal sinus and right transverse and sigmoid sinuses.    VISUALIZED ORBITS/SINUSES/MASTOIDS: No intraorbital abnormality. No paranasal sinus mucosal disease. No middle ear or mastoid effusion.    BONES/SOFT TISSUES: No acute abnormality.    HEAD CTV:  Limited due to poor contrast bolus.    DURAL SINUSES: Acute thrombus mildly distends the superior sagittal sinus and appears to involve several of the distal cortical veins draining into the superior sagittal sinus over the cerebral convexity. Probable thrombosis of the right transverse and   sigmoid sinuses. There appears to have been partial recanalization of the left transverse and sigmoid sinuses since the prior studies and the visualized portions of the proximal left internal jugular vein now appear patent. The straight sinus and vein of   Tyler appear patent.     INTERNAL CEREBRAL VEINS: No significant stenosis or occlusion.        Impression    IMPRESSION:   HEAD CT:  1.  Increased attenuation in the superior  sagittal and right transverse and sigmoid sinuses compatible with acute dural venous sinus thrombosis.  2.  No acute intracranial hemorrhage.  3.  Encephalomalacia bilateral frontal and temporal lobes corresponding to areas of hemorrhagic contusion seen on the prior MRI.    HEAD CTV:   1.  Limited examination.  2.  Dural venous sinus thrombosis involving the superior sagittal sinus as well as several of the distal cortical veins draining into the superior sagittal sinus over the cerebral convexity.  3.  Probable acute thrombus right transverse and sigmoid sinuses.  4.  Partial recanalization of the left transverse and sigmoid sinuses.    5.  The findings were discussed directly with Dr. Steward at 11:15 PM CST on 10/08/2024.   CTV Head with Contrast    Narrative    EXAM: CTV HEAD WITH CONTRAST, CT HEAD W/O CONTRAST  LOCATION: [facility]  DATE: 10/8/2024    INDICATION: Periorbital headache, history of cavernous sinus thrombosis, noncompliant with warfarin  COMPARISON: MR/MRV of the head dated 06/21/2024 and head CT dated 07/22/2024  CONTRAST: 67 ml Isovue 370  TECHNIQUE: Head CT venogram with IV contrast. Noncontrast head CT followed by axial helical CT images of the intracranial vessels obtained during the venous phase of intravenous contrast administration. Axial helical 2D reconstructed images and   multiplanar 3D MIP reconstructed images of the intracranial vessels were performed by the technologist. Dose reduction techniques were used.     FINDINGS:   NONCONTRAST HEAD CT:   INTRACRANIAL CONTENTS: No intracranial hemorrhage, extraaxial collection, or mass effect.  No CT evidence of acute infarct. Encephalomalacia bilateral orbitofrontal region, right greater than left, and involving the bilateral gyri recti corresponding to   areas of hemorrhagic contusion seen on the prior MRI. Very mild encephalomalacia inferior right and lateral left temporal lobes also corresponding to hemorrhagic  contusion seen on the prior MRI. Normal ventricles and sulci. Diffusely increased   attenuation within the superior sagittal sinus and right transverse and sigmoid sinuses.    VISUALIZED ORBITS/SINUSES/MASTOIDS: No intraorbital abnormality. No paranasal sinus mucosal disease. No middle ear or mastoid effusion.    BONES/SOFT TISSUES: No acute abnormality.    HEAD CTV:  Limited due to poor contrast bolus.    DURAL SINUSES: Acute thrombus mildly distends the superior sagittal sinus and appears to involve several of the distal cortical veins draining into the superior sagittal sinus over the cerebral convexity. Probable thrombosis of the right transverse and   sigmoid sinuses. There appears to have been partial recanalization of the left transverse and sigmoid sinuses since the prior studies and the visualized portions of the proximal left internal jugular vein now appear patent. The straight sinus and vein of   Tyler appear patent.     INTERNAL CEREBRAL VEINS: No significant stenosis or occlusion.        Impression    IMPRESSION:   HEAD CT:  1.  Increased attenuation in the superior sagittal and right transverse and sigmoid sinuses compatible with acute dural venous sinus thrombosis.  2.  No acute intracranial hemorrhage.  3.  Encephalomalacia bilateral frontal and temporal lobes corresponding to areas of hemorrhagic contusion seen on the prior MRI.    HEAD CTV:   1.  Limited examination.  2.  Dural venous sinus thrombosis involving the superior sagittal sinus as well as several of the distal cortical veins draining into the superior sagittal sinus over the cerebral convexity.  3.  Probable acute thrombus right transverse and sigmoid sinuses.  4.  Partial recanalization of the left transverse and sigmoid sinuses.    5.  The findings were discussed directly with Dr. Steward at 11:15 PM CST on 10/08/2024.       Medications   heparin 25,000 units in 0.45% NaCl 250 mL ANTICOAGULANT infusion ( Intravenous Canceled Entry  10/8/24 2348)   HYDROmorphone (PF) (DILAUDID) injection 0.5 mg (0.5 mg Intravenous $Given 10/8/24 2342)   metoclopramide (REGLAN) 10 mg in sodium chloride 0.9 % 50 mL infusion (10 mg Intravenous $Given 10/8/24 2251)   acetaminophen (TYLENOL) tablet 1,000 mg (1,000 mg Oral $Given 10/8/24 2250)   iopamidol (ISOVUE-370) solution 67 mL (67 mLs Intravenous $Given 10/8/24 2224)   sodium chloride 0.9 % bag 500mL for CT scan flush use (100 mLs As instructed $Given 10/8/24 2224)   heparin ANTICOAGULANT Loading dose for HIGH INTENSITY TREATMENT * Give BEFORE starting heparin infusion (7,250 Units Intravenous $Given 10/8/24 2342)       Assessments & Plan (with Medical Decision Making)     I have reviewed the nursing notes.    I have reviewed the findings, diagnosis, plan and need for follow up with the patient.          Medical Decision Making  Jaspreet Brooks is a 44 year old male who history of DVT on Coumadin, alcohol use disorder, antiphospholipid antibody syndrome, cannabis use disorder, history of cerebral venous thrombosis, history of subarachnoid hemorrhage, history of subdural hematoma who presents for evaluation of a headache.  Vital signs reviewed notable for mild hypertension otherwise reassuring.  Patient has a normal neurological exam.  His history and exam is concerning for recurrent dural sinus venous thrombosis, particularly given that pain started after sneezing and he has not been taking his warfarin.  His INR is a 1, consistent with him not taking his warfarin.  He was given Tylenol and Reglan.  CT and CTV are obtained and reviewed.  I received a call from the radiologist to inform me that patient has an acute dural sinus venous thrombosis.  It appears that his old venous thrombosis partially recanalized and this is a new acute thrombus.  Suspect this is likely due to his known antiphospholipid syndrome.  Patient was placed on a heparin drip.  Given Dilaudid for pain.  Discussed the case with Dr. Bucio of  stroke neurology.  He stated that unlikely patient will need any acute intervention and okay for the patient to stay locally for admission.  He recommends every 4  hour neurochecks, MRI and MRV, transition to Coumadin, and no need for seizure prophylaxis at this time.  I discussed the case with Dr. Jones who accepted for admission at [facility].  Patient updated and in agreement with the plan.        New Prescriptions    No medications on file       Final diagnoses:   Dural venous sinus thrombosis       10/8/2024   [facility] EMERGENCY DEPT       Claudy Steward MD  10/09/24 0211

## 2024-10-09 NOTE — PLAN OF CARE
Problem: Adult Inpatient Plan of Care  Goal: Plan of Care Review  Description: The Plan of Care Review/Shift note should be completed every shift.  The Outcome Evaluation is a brief statement about your assessment that the patient is improving, declining, or no change.  This information will be displayed automatically on your shift  note.  Outcome: Progressing  Flowsheets (Taken 10/9/2024 1630)  Plan of Care Reviewed With: patient  Overall Patient Progress: improving  Goal: Absence of Hospital-Acquired Illness or Injury  Intervention: Identify and Manage Fall Risk  Recent Flowsheet Documentation  Taken 10/9/2024 1600 by Sheryl Singh RN  Safety Promotion/Fall Prevention:   assistive device/personal items within reach   clutter free environment maintained   nonskid shoes/slippers when out of bed   patient and family education  Taken 10/9/2024 0956 by Sheryl Singh RN  Safety Promotion/Fall Prevention:   assistive device/personal items within reach   clutter free environment maintained   nonskid shoes/slippers when out of bed   patient and family education  Intervention: Prevent Skin Injury  Recent Flowsheet Documentation  Taken 10/9/2024 1600 by Sheryl Singh RN  Body Position: position changed independently  Taken 10/9/2024 0956 by Sheryl Singh RN  Body Position: position changed independently  Goal: Optimal Comfort and Wellbeing  Intervention: Monitor Pain and Promote Comfort  Recent Flowsheet Documentation  Taken 10/9/2024 1630 by Sheryl Singh RN  Pain Management Interventions:   medication (see MAR)   care clustered   quiet environment facilitated   rest     Problem: Pain Acute  Goal: Optimal Pain Control and Function  Intervention: Develop Pain Management Plan  Flowsheets (Taken 10/9/2024 1630)  Pain Management Interventions:   medication (see MAR)   care clustered   quiet environment facilitated   rest  Intervention: Optimize Psychosocial Wellbeing  Flowsheets (Taken 10/9/2024  8285)  Supportive Measures:   active listening utilized   self-care encouraged